# Patient Record
Sex: FEMALE | Race: WHITE | NOT HISPANIC OR LATINO | Employment: FULL TIME | URBAN - NONMETROPOLITAN AREA
[De-identification: names, ages, dates, MRNs, and addresses within clinical notes are randomized per-mention and may not be internally consistent; named-entity substitution may affect disease eponyms.]

---

## 2017-01-05 PROBLEM — R56.9 GENERALIZED CONVULSIVE SEIZURE (HCC): Status: ACTIVE | Noted: 2017-01-05

## 2017-01-05 PROBLEM — G40.909 SEIZURE DISORDER (HCC): Status: ACTIVE | Noted: 2017-01-05

## 2017-03-09 ENCOUNTER — TELEPHONE (OUTPATIENT)
Dept: NEUROLOGY | Facility: CLINIC | Age: 32
End: 2017-03-09

## 2017-03-09 DIAGNOSIS — R56.9 GENERALIZED CONVULSIVE SEIZURES (HCC): Primary | ICD-10-CM

## 2017-03-09 RX ORDER — LAMOTRIGINE 100 MG/1
100 TABLET ORAL 2 TIMES DAILY
Qty: 60 TABLET | Refills: 0 | Status: SHIPPED | OUTPATIENT
Start: 2017-03-09 | End: 2017-03-14

## 2017-03-14 DIAGNOSIS — R56.9 GENERALIZED CONVULSIVE SEIZURES (HCC): Primary | ICD-10-CM

## 2017-03-14 RX ORDER — LAMOTRIGINE 200 MG/1
200 TABLET ORAL 2 TIMES DAILY
Qty: 30 TABLET | Refills: 0 | Status: SHIPPED | OUTPATIENT
Start: 2017-03-14 | End: 2017-04-10 | Stop reason: SDUPTHER

## 2017-04-10 DIAGNOSIS — R56.9 GENERALIZED CONVULSIVE SEIZURES (HCC): ICD-10-CM

## 2017-04-10 RX ORDER — LAMOTRIGINE 200 MG/1
200 TABLET ORAL 2 TIMES DAILY
Qty: 30 TABLET | Refills: 3 | Status: SHIPPED | OUTPATIENT
Start: 2017-04-10 | End: 2017-04-12 | Stop reason: SDUPTHER

## 2017-04-12 ENCOUNTER — OFFICE VISIT (OUTPATIENT)
Dept: NEUROLOGY | Facility: CLINIC | Age: 32
End: 2017-04-12

## 2017-04-12 VITALS
HEART RATE: 64 BPM | HEIGHT: 63 IN | SYSTOLIC BLOOD PRESSURE: 112 MMHG | WEIGHT: 127 LBS | OXYGEN SATURATION: 98 % | BODY MASS INDEX: 22.5 KG/M2 | DIASTOLIC BLOOD PRESSURE: 68 MMHG

## 2017-04-12 DIAGNOSIS — R56.9 GENERALIZED CONVULSIVE SEIZURES (HCC): ICD-10-CM

## 2017-04-12 PROCEDURE — 99212 OFFICE O/P EST SF 10 MIN: CPT | Performed by: NURSE PRACTITIONER

## 2017-04-12 RX ORDER — LAMOTRIGINE 200 MG/1
200 TABLET ORAL 2 TIMES DAILY
Qty: 30 TABLET | Refills: 3 | Status: SHIPPED | OUTPATIENT
Start: 2017-04-12 | End: 2017-04-12 | Stop reason: SDUPTHER

## 2017-04-12 RX ORDER — LAMOTRIGINE 200 MG/1
200 TABLET ORAL 2 TIMES DAILY
Qty: 30 TABLET | Refills: 10 | Status: SHIPPED | OUTPATIENT
Start: 2017-04-12 | End: 2017-10-03 | Stop reason: SDUPTHER

## 2017-04-12 NOTE — PROGRESS NOTES
Subjective   Aydee Nazario is a 31 y.o. female    Chief Complaint   Patient presents with   • Seizures     reports that she remains sz free. states that the last sz was in 2014. reports no problems with her lamictal.       History of Present Illness     Patient's very pleasant 31-year-old female with sz d/o in clinic to follow up.  Last sz 2014 states ran out of meds couldn't get refilled.  No sz since states his only she started using her Lamictal 200 mg twice a day she is seizure-free.  She is working has her questions is driving and states that she's not having any problems today.    The following portions of the patient's history were reviewed and updated as appropriate: allergies, current medications, past family history, past medical history, past social history, past surgical history and problem list.    Review of Systems   Constitutional: Negative for chills and fever.   HENT: Negative for congestion, ear pain, hearing loss, rhinorrhea and sore throat.    Eyes: Negative for pain, discharge and redness.   Respiratory: Negative for cough, shortness of breath, wheezing and stridor.    Cardiovascular: Negative for chest pain, palpitations and leg swelling.   Gastrointestinal: Negative for abdominal pain, constipation, nausea and vomiting.   Endocrine: Negative for cold intolerance, heat intolerance and polyphagia.   Genitourinary: Negative for dysuria, flank pain, frequency and urgency.   Musculoskeletal: Negative for joint swelling, myalgias, neck pain and neck stiffness.   Skin: Negative for pallor, rash and wound.   Allergic/Immunologic: Negative for environmental allergies.   Neurological: Positive for seizures. Negative for dizziness, tremors, syncope, facial asymmetry, speech difficulty, weakness, light-headedness, numbness and headaches.   Hematological: Negative for adenopathy.   Psychiatric/Behavioral: Negative for confusion and hallucinations. The patient is not nervous/anxious.   "      Objective         Vitals:    04/12/17 1055   BP: 112/68   Pulse: 64   SpO2: 98%   Weight: 127 lb (57.6 kg)   Height: 63\" (160 cm)     GENERAL: Patient is pleasant, cooperative, appears to be stated age.  Body habitus is endomorphic.  HEAD:  Head is normocephalic and atraumatic.    NECK: Neck are non-tender without thyromegaly or adenopathy.  Carotic upstrokes are 1+/4.  No cranial or cervical bruits.  The neck is supple with a full range of motion.   CARDIOVASCULAR:  Regular rate and rhythm with normal S1 and S2 without rub or gallop.  RESPIRATORY:  Clear to auscultation without wheezes or crackle   PSYCH:  Higher cortical function/mental status:  The patient is alert.  She is oriented x3 to time, place and person.  Recent and the remote memory appear normal.  The patient has a good fund of knowledge.  There is no visual or auditory hallucination or suicidal or homicidal ideation.  SPEECH:There is no gross evidence of aphasia, dysarthria or agnosia.      MOTOR: Strength is 5/5 throughout with normal tone and bulk with the following exceptions, 4/5 intrinsic muscles of the hands and feet.  No involuntary movements noted.    Coordination:  The patient normally performs finger-nose-finger, heel-to-knee-to-shin and rapid alternating movements in symmetrical fashion.    COORDINATION AND GAIT:  The patient walks with a narrow-based gait.     Results for orders placed or performed during the hospital encounter of 10/28/16   hCG, Quantitative, Pregnancy   Result Value Ref Range    HCG Quantitative <2.39 mIU/mL         I have personally reviewed the above labs     Assessment/Plan   Seizure disorder: Continue Lamictal 200 mg 1 by mouth twice a day patient is stable we'll have him return to clinic in 12 months.    I have counseled patient extensively on epileptic seizure.  Epileptic seizures are a common and important medical problem, with about one in 11 persons experiencing at least one seizure at some point. The " management of patients with epilepsy is often challenging, as evidenced by a recent report that over one half of all patients with epilepsy continue to experience at least occasional seizures despite treatment with antiepileptic medications.   We have also discussed various diagnostic testing.  Diagnostic studies must be tailored to this particular patient. Basic laboratory evaluation focuses on detecting systemic disturbances potentially associated with seizures were explored.  The imaging modality of choice is magnetic resonance imaging (MRI). Electroencephalography (EEG) is helpful in the evaluation of this patient. The utility of EEG includes detection of epileptiform activity, strengthening the putative diagnosis; identification of focal electrocerebral abnormalities suggesting a focal structural brain lesion; and documentation of specific epileptiform patterns associated with particular epilepsy syndromes would be very beneficial.   For patients with relatively infrequent seizures (in whom it is difficult to gauge the response to treatment without waiting many months for the next seizure to occur), a logical approach is to promptly increase the medication to the maximum tolerated dosage and maintain it at this level. This can be accomplished by increasing the agent until the patient begins to experience expected dose-related side effects, and then reducing the dosage to the immediately previous dosage that did not produce the adverse effects. Once a steady state with the refined dosage has been achieved, it is useful to check the trough drug serum level as a reference point for the maximum tolerated dosage.  If the patient eventually experiences a seizure when the serum level is at the reference point, the trial of medication can be considered a failure. This procedure enables assessment of efficacy in a manner significantly more efficient than simply beginning at an average dosage and waiting for the next  seizure before the next increase is implemented. If adequate seizure control-which should be defined as complete seizure control for most patients-is not achieved at the maximum tolerated dosage of the first medication, consideration should be given to referring the patient for neurologic consultation, if this has not yet been undertaken. Usually, a second agent will need to be added.

## 2017-10-03 DIAGNOSIS — R56.9 GENERALIZED CONVULSIVE SEIZURES (HCC): ICD-10-CM

## 2017-10-03 RX ORDER — LAMOTRIGINE 200 MG/1
200 TABLET ORAL 2 TIMES DAILY
Qty: 60 TABLET | Refills: 5 | Status: SHIPPED | OUTPATIENT
Start: 2017-10-03 | End: 2018-04-02 | Stop reason: SDUPTHER

## 2018-04-02 DIAGNOSIS — R56.9 GENERALIZED CONVULSIVE SEIZURES (HCC): ICD-10-CM

## 2018-04-03 DIAGNOSIS — R56.9 GENERALIZED CONVULSIVE SEIZURES (HCC): ICD-10-CM

## 2018-04-03 RX ORDER — LAMOTRIGINE 200 MG/1
200 TABLET ORAL 2 TIMES DAILY
Qty: 60 TABLET | Refills: 5 | Status: SHIPPED | OUTPATIENT
Start: 2018-04-03 | End: 2018-09-21 | Stop reason: SDUPTHER

## 2018-04-03 RX ORDER — LAMOTRIGINE 200 MG/1
200 TABLET ORAL
Qty: 60 TABLET | Refills: 5 | OUTPATIENT
Start: 2018-04-03 | End: 2019-04-03

## 2018-08-08 ENCOUNTER — TELEPHONE (OUTPATIENT)
Dept: URGENT CARE | Facility: CLINIC | Age: 33
End: 2018-08-08

## 2018-08-15 ENCOUNTER — TELEPHONE (OUTPATIENT)
Dept: URGENT CARE | Facility: CLINIC | Age: 33
End: 2018-08-15

## 2018-08-15 NOTE — TELEPHONE ENCOUNTER
----- Message from Lin Hernandez MD sent at 8/15/2018 12:34 PM EDT -----  Last urine cx done at office visit on 8-9-18 showed no growth so patient did not have UTI; symptoms likely due to bacterial vaginosis which was treated with metronidazole

## 2018-09-21 ENCOUNTER — OFFICE VISIT (OUTPATIENT)
Dept: NEUROLOGY | Facility: CLINIC | Age: 33
End: 2018-09-21

## 2018-09-21 VITALS
WEIGHT: 139 LBS | SYSTOLIC BLOOD PRESSURE: 112 MMHG | HEART RATE: 69 BPM | OXYGEN SATURATION: 98 % | HEIGHT: 63 IN | BODY MASS INDEX: 24.63 KG/M2 | DIASTOLIC BLOOD PRESSURE: 70 MMHG

## 2018-09-21 DIAGNOSIS — G40.909 SEIZURE DISORDER (HCC): Primary | ICD-10-CM

## 2018-09-21 DIAGNOSIS — R56.9 GENERALIZED CONVULSIVE SEIZURES (HCC): ICD-10-CM

## 2018-09-21 PROCEDURE — 99213 OFFICE O/P EST LOW 20 MIN: CPT | Performed by: NURSE PRACTITIONER

## 2018-09-21 RX ORDER — LAMOTRIGINE 200 MG/1
200 TABLET ORAL 2 TIMES DAILY
Qty: 60 TABLET | Refills: 11 | Status: SHIPPED | OUTPATIENT
Start: 2018-09-21 | End: 2019-09-13 | Stop reason: SDUPTHER

## 2018-09-21 NOTE — PROGRESS NOTES
Subjective   Aydee Nazario is a 33 y.o. female     Chief Complaint   Patient presents with   • Follow-up     Pt is here for a 1 year FU for her seizures. Pt states that she has been seizure free for approx 4 years. Pt states that her medications are working and does need refills at this time.        History of Present Illness     Patient is very pleasant 33-year-old female in clinic today to follow-up seizure disorder for her one-year appointment.  Patient continues to be seizure-free since 2014 she uses Lamictal 200 mg twice a day which she states also helps control with her mood patient's very pleased with her current medication and does not one any changes she states that she does need a refill and denies any other issues at this time.    The following portions of the patient's history were reviewed and updated as appropriate: allergies, current medications, past family history, past medical history, past social history, past surgical history and problem list.    Review of Systems   Constitutional: Negative for chills and fever.   HENT: Negative for congestion, ear pain, hearing loss, rhinorrhea and sore throat.    Eyes: Negative for pain, discharge and redness.   Respiratory: Negative for cough, shortness of breath, wheezing and stridor.    Cardiovascular: Negative for chest pain, palpitations and leg swelling.   Gastrointestinal: Negative for abdominal pain, constipation, nausea and vomiting.   Endocrine: Negative for cold intolerance, heat intolerance and polyphagia.   Genitourinary: Negative for dysuria, flank pain, frequency and urgency.   Musculoskeletal: Negative for joint swelling, myalgias, neck pain and neck stiffness.   Skin: Negative for pallor, rash and wound.   Allergic/Immunologic: Negative for environmental allergies.   Neurological: Positive for seizures. Negative for dizziness, tremors, syncope, facial asymmetry, speech difficulty, weakness, light-headedness, numbness and headaches.  "  Hematological: Negative for adenopathy.   Psychiatric/Behavioral: Negative for confusion and hallucinations. The patient is nervous/anxious.        Objective       Vitals:    09/21/18 1545   BP: 112/70   Pulse: 69   SpO2: 98%   Weight: 63 kg (139 lb)   Height: 160 cm (63\")     GENERAL: Patient is pleasant, cooperative, appears to be stated age.  Body habitus is endomorphic.  HEAD:  Head is normocephalic and atraumatic.    NECK: Neck are non-tender without thyromegaly or adenopathy.  Carotic upstrokes are 1+/4.  No cranial or cervical bruits.  The neck is supple with a full range of motion.   CARDIOVASCULAR:  Regular rate and rhythm with normal S1 and S2 without rub or gallop.  RESPIRATORY:  Clear to auscultation without wheezes or crackle   PSYCH:   The patient is alert.  She  is oriented x3 to time, place and person.  Recent and the remote memory appear normal.  The patient has a good fund of knowledge.  There is no visual or auditory hallucination or suicidal or homicidal ideation.  SPEECH:There is no gross evidence of aphasia, dysarthria or agnosia.      CRANIAL NERVES: Extraocular movements are full and smooth with normal pursuits and saccades.  No nystagmus noted.  The face is symmetric. Tongue midline.   COORDINATION AND GAIT:  The patient walks with a narrow-based gait.     Results for orders placed or performed during the hospital encounter of 08/07/18   Urine Culture - Urine, Urine, Clean Catch   Result Value Ref Range    Urine Culture Final report     Result 1 No growth    POC Urinalysis Dipstick, Multipro (Automated dipstick)   Result Value Ref Range    Color Dark Yellow Yellow, Straw, Dark Yellow, Luann    Clarity, UA Cloudy (A) Clear    Glucose, UA Negative Negative, 1000 mg/dL (3+) mg/dL    Bilirubin Negative Negative    Ketones, UA Negative Negative    Specific Gravity  1.030 1.005 - 1.030    Blood, UA Negative Negative    pH, Urine 6.0 5.0 - 8.0    Protein, POC Negative Negative mg/dL    " Urobilinogen, UA Normal Normal    Nitrite, UA Negative Negative    Leukocytes Small (1+) (A) Negative       I have personally reviewed the above labs. Pt follows with PCP.    Assessment/Plan     1.  Seizure disorder: Patient seizure-free since 2014 we'll continue with Lamictal 200 mg twice a day return to clinic in one year.

## 2019-04-03 PROBLEM — R30.0 DYSURIA: Status: ACTIVE | Noted: 2019-04-03

## 2019-09-13 DIAGNOSIS — R56.9 GENERALIZED CONVULSIVE SEIZURES (HCC): ICD-10-CM

## 2019-09-13 RX ORDER — LAMOTRIGINE 200 MG/1
TABLET ORAL
Qty: 60 TABLET | Refills: 11 | Status: SHIPPED | OUTPATIENT
Start: 2019-09-13 | End: 2019-09-24 | Stop reason: SDUPTHER

## 2019-09-24 ENCOUNTER — OFFICE VISIT (OUTPATIENT)
Dept: NEUROLOGY | Facility: CLINIC | Age: 34
End: 2019-09-24

## 2019-09-24 VITALS
HEART RATE: 78 BPM | DIASTOLIC BLOOD PRESSURE: 60 MMHG | WEIGHT: 151.4 LBS | HEIGHT: 63 IN | SYSTOLIC BLOOD PRESSURE: 126 MMHG | BODY MASS INDEX: 26.82 KG/M2 | TEMPERATURE: 96.9 F | OXYGEN SATURATION: 99 %

## 2019-09-24 DIAGNOSIS — R56.9 GENERALIZED CONVULSIVE SEIZURES (HCC): ICD-10-CM

## 2019-09-24 PROCEDURE — 99212 OFFICE O/P EST SF 10 MIN: CPT | Performed by: NURSE PRACTITIONER

## 2019-09-24 RX ORDER — CHLORHEXIDINE GLUCONATE 0.12 MG/ML
RINSE ORAL
COMMUNITY
Start: 2019-06-19 | End: 2020-08-07

## 2019-09-24 RX ORDER — LAMOTRIGINE 200 MG/1
200 TABLET ORAL 2 TIMES DAILY
Qty: 60 TABLET | Refills: 11 | Status: SHIPPED | OUTPATIENT
Start: 2019-09-24 | End: 2020-04-30 | Stop reason: SDUPTHER

## 2020-02-25 ENCOUNTER — TELEPHONE (OUTPATIENT)
Dept: NEUROLOGY | Facility: CLINIC | Age: 35
End: 2020-02-25

## 2020-02-25 NOTE — TELEPHONE ENCOUNTER
Provider: ALICIA MIKE  Caller: JULIENNE DOOLEY  Relationship to Patient: SELF  Phone Number: [phone]     Reason for Call: PT CALLED TO RESCHEDULE APPT WITH A NEW NEUROLOGIST DUE TO ALICIA MIKE NO LONGER THERE.    YOU CAN CALL PT BACK [phone]

## 2020-03-05 ENCOUNTER — TELEPHONE (OUTPATIENT)
Dept: NEUROLOGY | Facility: CLINIC | Age: 35
End: 2020-03-05

## 2020-03-05 NOTE — TELEPHONE ENCOUNTER
JULIENNE  [phone]    NEEDS TO BE SCHEDULED FOR HER 1 YEAR FOLLOW UP AND SHE WANTS TO STAY IN [facility]

## 2020-03-05 NOTE — TELEPHONE ENCOUNTER
THERE IS NO ONE IN THE [place] OFFICE SHE CAN FOLLOW UP WITH.  IF SHE WOULD LIKE TO BE REFERRED TO DR ADRIAN IN [place], WE WILL REFER HER OUT.

## 2020-03-10 ENCOUNTER — TELEPHONE (OUTPATIENT)
Dept: NEUROLOGY | Facility: OTHER | Age: 35
End: 2020-03-10

## 2020-03-10 NOTE — TELEPHONE ENCOUNTER
Provider: ALICIA MIKE  Caller: JULIENNE DOOLEY  Relationship to Patient: SELF  Phone Number: [phone]      Reason for Call: PT CALLING TO GET SET UP WITH A NEW PROVIDER AND DOES NOT WANT TO TRAVEL TO [place], PLEASE CALL HER [phone].

## 2020-03-10 NOTE — TELEPHONE ENCOUNTER
LEFT MSG FOR PT TO CALL OFFICE CONCERNING A NEW PROVIDER.  CURRENTLY [facility] IS NOT ABLE TO SEE SEIZURE DIAGNOSIS.     THE PATIENT CAN BE REFERRED TO [place] OFFICE TO SEE DR CORNELL .      PT STATED IN PRIOR MSG THAT SHE COULD NOT GO TO [place].  SHE WOULD NEED TO CONTACT HER PRIMARY CARE PROVIDER FOR CONTINUATION OF CARE FOR A NEW PROVIDER.

## 2020-04-09 ENCOUNTER — TELEPHONE (OUTPATIENT)
Dept: NEUROLOGY | Facility: CLINIC | Age: 35
End: 2020-04-09

## 2020-04-09 NOTE — TELEPHONE ENCOUNTER
Please schedule patient a VIDEO VISIT to establish care with our office and we are happy to send any refills she needs. Per the chart review of meds, SUSSY Peñaloza sent in her lamotrigine for 1 year of refills 9/2019 and should last her, but we can definitely refill if pharmacy says she is running low. I am happy to see her for a Video Visit. Thanks.

## 2020-04-09 NOTE — TELEPHONE ENCOUNTER
PT HAS APPOINTMENT WITH  9- . PT STATES SHE IS GOING TO RUN OUT OF MEDICATION AROUND AUGUST.    PLEASE ADVISE    JULIENNE  [phone]

## 2020-04-09 NOTE — TELEPHONE ENCOUNTER
Her les is empty. She is only on lamictal which is not controlled and she has plenty of medication until 4/30/2020. Thanks.

## 2020-04-09 NOTE — TELEPHONE ENCOUNTER
SENT ENCOUNTER TO [facility] , THEY STATE SHE IS NOW A PATIENT OF [facility] .     PT APT WITH  - 9-29-20  LAST TIME SEEN - 9-24-19 ( ALICIA MIKE)    PATIENT STATES SHE WILL NEED REFILL BEFORE APPOINTMENT. PLEASE ADVISE!

## 2020-04-30 ENCOUNTER — OFFICE VISIT (OUTPATIENT)
Dept: NEUROLOGY | Facility: CLINIC | Age: 35
End: 2020-04-30

## 2020-04-30 DIAGNOSIS — R56.9 GENERALIZED CONVULSIVE SEIZURES (HCC): ICD-10-CM

## 2020-04-30 DIAGNOSIS — G40.909 SEIZURE DISORDER (HCC): Primary | ICD-10-CM

## 2020-04-30 PROCEDURE — 99441 PR PHYS/QHP TELEPHONE EVALUATION 5-10 MIN: CPT | Performed by: PSYCHIATRY & NEUROLOGY

## 2020-04-30 RX ORDER — LAMOTRIGINE 200 MG/1
200 TABLET ORAL 2 TIMES DAILY
Qty: 60 TABLET | Refills: 11 | Status: SHIPPED | OUTPATIENT
Start: 2020-04-30 | End: 2021-05-07

## 2020-04-30 NOTE — PROGRESS NOTES
Subjective:     Patient ID: Aydee Nazario is a 34 y.o. female.    CC:   Chief Complaint   Patient presents with   • Seizures     You have chosen to receive care through a telephone visit. Do you consent to use a telephone visit for your medical care today? Yes    HPI:   History of Present Illness  The following portions of the patient's history were reviewed and updated as appropriate: allergies, current medications, past family history, past medical history, past social history, past surgical history and problem list.     Patient denies any seizures, new concerns, needs refills. She is s/p tubal ligation. Last seizure reported in . Reports that seizures are hereditary, prior brain scan negative.    Past Medical History:   Diagnosis Date   • Epilepsy (CMS/[facility])    • H/O colposcopy with cervical biopsy 09/15/2011   • History of abnormal cervical Pap smear 10/11/2010    ASCUS + HPV (DURING PREGNANCY)   • History of abnormal cervical Pap smear 2011    HSIL, MODERATE DYSPLASIA   • History of Papanicolaou smear of cervix 10/29/2013    NORMAL    • HPV in female        Past Surgical History:   Procedure Laterality Date   •  SECTION PRIMARY  2011    DR ANNETTE BIRD   •  SECTION WITH TUBAL Bilateral 2014    DR LOVE BOLAND   • DENTAL PROCEDURE     • TUBAL ABDOMINAL LIGATION         Social History     Socioeconomic History   • Marital status:      Spouse name: Not on file   • Number of children: Not on file   • Years of education: Not on file   • Highest education level: Not on file   Tobacco Use   • Smoking status: Never Smoker   • Smokeless tobacco: Never Used   Substance and Sexual Activity   • Alcohol use: No   • Drug use: No   • Sexual activity: Defer     Birth control/protection: Surgical       Family History   Problem Relation Age of Onset   • Cancer Other    • Dementia Other    • Seizures Other         epilepsy and recurrent seizures        Review of Systems      Objective:  There were no vitals taken for this visit.    Neurologic Exam     Mental Status   Alert and oriented, speech clear.           Assessment/Plan:       Aydee was seen today for seizures.    Diagnoses and all orders for this visit:    Seizure disorder (CMS/HCC)    Generalized convulsive seizures (CMS/HCC)  -     lamoTRIgine (LaMICtal) 200 MG tablet; Take 1 tablet by mouth 2 (Two) Times a Day.        -     Patient is to call for problems, questions.       Time on the phone: 10 min.        Thomas Olson MD  4/30/2020

## 2021-01-07 ENCOUNTER — TELEPHONE (OUTPATIENT)
Dept: NEUROLOGY | Facility: CLINIC | Age: 36
End: 2021-01-07

## 2021-01-07 NOTE — TELEPHONE ENCOUNTER
Provider: DR. ELIZABETH  Caller: JULIENNE DOOLEY  Relationship to Patient: SELF    Reason for Call: PT CALLING SHE HAS AN APPT ON 4- AND HER LAST VISIT WAS A TELEPHONE VISIT ON 4-. SHE IS WANTING TO SEE IF SHE CAN DO ANOTHER TELEPHONE VISIT ON THE UPCOMING VISIT ON 4-.    PLEASE CALL HER BACK [phone]

## 2021-04-06 ENCOUNTER — TELEPHONE (OUTPATIENT)
Dept: NEUROLOGY | Facility: CLINIC | Age: 36
End: 2021-04-06

## 2021-05-07 ENCOUNTER — HOSPITAL ENCOUNTER (EMERGENCY)
Facility: HOSPITAL | Age: 36
Discharge: HOME | End: 2021-05-07
Attending: EMERGENCY MEDICINE | Admitting: EMERGENCY MEDICINE

## 2021-05-07 VITALS
RESPIRATION RATE: 16 BRPM | DIASTOLIC BLOOD PRESSURE: 72 MMHG | OXYGEN SATURATION: 99 % | WEIGHT: 158 LBS | HEART RATE: 71 BPM | HEIGHT: 63 IN | BODY MASS INDEX: 28 KG/M2 | TEMPERATURE: 97.9 F | SYSTOLIC BLOOD PRESSURE: 120 MMHG

## 2021-05-07 DIAGNOSIS — N92.6 IRREGULAR MENSES: Primary | ICD-10-CM

## 2021-05-07 DIAGNOSIS — R56.9 GENERALIZED CONVULSIVE SEIZURES (HCC): ICD-10-CM

## 2021-05-07 LAB — B-HCG UR QL: NEGATIVE

## 2021-05-07 PROCEDURE — 81025 URINE PREGNANCY TEST: CPT | Performed by: PHYSICIAN ASSISTANT

## 2021-05-07 PROCEDURE — 99283 EMERGENCY DEPT VISIT LOW MDM: CPT

## 2021-05-07 RX ORDER — LAMOTRIGINE 200 MG/1
TABLET ORAL
Qty: 60 TABLET | Refills: 11 | Status: SHIPPED | OUTPATIENT
Start: 2021-05-07 | End: 2021-08-20 | Stop reason: SDUPTHER

## 2021-08-20 ENCOUNTER — OFFICE VISIT (OUTPATIENT)
Dept: NEUROLOGY | Facility: CLINIC | Age: 36
End: 2021-08-20

## 2021-08-20 ENCOUNTER — LAB (OUTPATIENT)
Dept: LAB | Facility: HOSPITAL | Age: 36
End: 2021-08-20

## 2021-08-20 VITALS
WEIGHT: 161 LBS | DIASTOLIC BLOOD PRESSURE: 60 MMHG | BODY MASS INDEX: 28.53 KG/M2 | SYSTOLIC BLOOD PRESSURE: 100 MMHG | TEMPERATURE: 97.6 F | OXYGEN SATURATION: 98 % | HEART RATE: 96 BPM | HEIGHT: 63 IN

## 2021-08-20 DIAGNOSIS — G40.909 SEIZURE DISORDER (HCC): Primary | ICD-10-CM

## 2021-08-20 DIAGNOSIS — R56.9 GENERALIZED CONVULSIVE SEIZURES (HCC): ICD-10-CM

## 2021-08-20 DIAGNOSIS — G40.909 SEIZURE DISORDER (HCC): ICD-10-CM

## 2021-08-20 LAB
ALBUMIN SERPL-MCNC: 4.5 G/DL (ref 3.5–5.2)
ALBUMIN/GLOB SERPL: 1.5 G/DL
ALP SERPL-CCNC: 111 U/L (ref 39–117)
ALT SERPL W P-5'-P-CCNC: 15 U/L (ref 1–33)
ANION GAP SERPL CALCULATED.3IONS-SCNC: 7.4 MMOL/L (ref 5–15)
AST SERPL-CCNC: 20 U/L (ref 1–32)
BASOPHILS # BLD AUTO: 0.01 10*3/MM3 (ref 0–0.2)
BASOPHILS NFR BLD AUTO: 0.2 % (ref 0–1.5)
BILIRUB SERPL-MCNC: 0.3 MG/DL (ref 0–1.2)
BUN SERPL-MCNC: 9 MG/DL (ref 6–20)
BUN/CREAT SERPL: 11.7 (ref 7–25)
CALCIUM SPEC-SCNC: 9.8 MG/DL (ref 8.6–10.5)
CHLORIDE SERPL-SCNC: 103 MMOL/L (ref 98–107)
CO2 SERPL-SCNC: 27.6 MMOL/L (ref 22–29)
CREAT SERPL-MCNC: 0.77 MG/DL (ref 0.57–1)
DEPRECATED RDW RBC AUTO: 43 FL (ref 37–54)
EOSINOPHIL # BLD AUTO: 0.02 10*3/MM3 (ref 0–0.4)
EOSINOPHIL NFR BLD AUTO: 0.4 % (ref 0.3–6.2)
ERYTHROCYTE [DISTWIDTH] IN BLOOD BY AUTOMATED COUNT: 13.4 % (ref 12.3–15.4)
GFR SERPL CREATININE-BSD FRML MDRD: 84 ML/MIN/1.73
GLOBULIN UR ELPH-MCNC: 3 GM/DL
GLUCOSE SERPL-MCNC: 76 MG/DL (ref 65–99)
HCT VFR BLD AUTO: 41.6 % (ref 34–46.6)
HGB BLD-MCNC: 13.4 G/DL (ref 12–15.9)
IMM GRANULOCYTES # BLD AUTO: 0.02 10*3/MM3 (ref 0–0.05)
IMM GRANULOCYTES NFR BLD AUTO: 0.4 % (ref 0–0.5)
LYMPHOCYTES # BLD AUTO: 1.41 10*3/MM3 (ref 0.7–3.1)
LYMPHOCYTES NFR BLD AUTO: 25.1 % (ref 19.6–45.3)
MCH RBC QN AUTO: 28.3 PG (ref 26.6–33)
MCHC RBC AUTO-ENTMCNC: 32.2 G/DL (ref 31.5–35.7)
MCV RBC AUTO: 87.9 FL (ref 79–97)
MONOCYTES # BLD AUTO: 0.46 10*3/MM3 (ref 0.1–0.9)
MONOCYTES NFR BLD AUTO: 8.2 % (ref 5–12)
NEUTROPHILS NFR BLD AUTO: 3.69 10*3/MM3 (ref 1.7–7)
NEUTROPHILS NFR BLD AUTO: 65.7 % (ref 42.7–76)
NRBC BLD AUTO-RTO: 0 /100 WBC (ref 0–0.2)
PLATELET # BLD AUTO: 295 10*3/MM3 (ref 140–450)
PMV BLD AUTO: 10.5 FL (ref 6–12)
POTASSIUM SERPL-SCNC: 4.3 MMOL/L (ref 3.5–5.2)
PROT SERPL-MCNC: 7.5 G/DL (ref 6–8.5)
RBC # BLD AUTO: 4.73 10*6/MM3 (ref 3.77–5.28)
SODIUM SERPL-SCNC: 138 MMOL/L (ref 136–145)
TSH SERPL DL<=0.05 MIU/L-ACNC: 0.87 UIU/ML (ref 0.27–4.2)
WBC # BLD AUTO: 5.61 10*3/MM3 (ref 3.4–10.8)

## 2021-08-20 PROCEDURE — 36415 COLL VENOUS BLD VENIPUNCTURE: CPT

## 2021-08-20 PROCEDURE — 80050 GENERAL HEALTH PANEL: CPT

## 2021-08-20 PROCEDURE — 99213 OFFICE O/P EST LOW 20 MIN: CPT | Performed by: NURSE PRACTITIONER

## 2021-08-20 RX ORDER — LAMOTRIGINE 200 MG/1
200 TABLET ORAL 2 TIMES DAILY
Qty: 60 TABLET | Refills: 11 | Status: SHIPPED | OUTPATIENT
Start: 2021-08-20 | End: 2022-08-10 | Stop reason: SDUPTHER

## 2021-08-20 NOTE — PROGRESS NOTES
Please let patient know her thyroid study was in normal range, kidney/electrolyte /liver panel normal as well.  CBC stable.

## 2021-08-20 NOTE — PROGRESS NOTES
"Subjective:     Patient ID: Aydee Nazario is a 35 y.o. female.    CC:   Chief Complaint   Patient presents with   • Seizures       HPI:   History of Present Illness     Ms. Nazario is a very pleasant 35-year-old female here today for follow-up on seizure disorder.  She was a previous patient of Dr. Cleaning and Aries Howard in [facility].  She has a long history of seizures dating back to around the age of 7.  She has what she describes as generalized convulsive seizures.  She has been on lamotrigine for many years, she currently takes 200 mg twice a day.  Many years ago she was taken off medication and had rebound seizure.  Her last seizure was 7 years ago.  She does very well on the lamotrigine with no adverse side effects.  She denies headaches, vision changes, dizziness, weakness or falls.  She has no history of head injury, her mother had epilepsy but \"grew out of it\".  The following portions of the patient's history were reviewed and updated as appropriate: allergies, current medications, past family history, past medical history, past social history, past surgical history and problem list.    Past Medical History:   Diagnosis Date   • Epilepsy (CMS/[facility])    • H/O colposcopy with cervical biopsy 09/15/2011   • History of abnormal cervical Pap smear 10/11/2010    ASCUS + HPV (DURING PREGNANCY)   • History of abnormal cervical Pap smear 2011    HSIL, MODERATE DYSPLASIA   • History of Papanicolaou smear of cervix 10/29/2013    NORMAL    • HPV in female        Past Surgical History:   Procedure Laterality Date   •  SECTION PRIMARY  2011    DR ANNETTE BIRD   •  SECTION WITH TUBAL Bilateral 2014    DR LOVE BOLAND   • DENTAL PROCEDURE     • TUBAL ABDOMINAL LIGATION         Social History     Socioeconomic History   • Marital status:      Spouse name: Not on file   • Number of children: Not on file   • Years of education: Not on file   • Highest education level: Not " "on file   Tobacco Use   • Smoking status: Never Smoker   • Smokeless tobacco: Never Used   Vaping Use   • Vaping Use: Never used   Substance and Sexual Activity   • Alcohol use: No   • Drug use: No   • Sexual activity: Defer     Birth control/protection: Surgical       Family History   Problem Relation Age of Onset   • Cancer Other    • Dementia Other    • Seizures Other         epilepsy and recurrent seizures        Review of Systems   Constitutional: Negative.    HENT: Negative.    Eyes: Negative.    Respiratory: Negative.    Cardiovascular: Negative.    Gastrointestinal: Negative.    Endocrine: Negative.    Genitourinary: Negative.    Musculoskeletal: Negative.    Skin: Negative.    Allergic/Immunologic: Negative.    Neurological: Positive for seizures. Negative for dizziness, tremors, syncope, facial asymmetry, speech difficulty, weakness, light-headedness, numbness and headaches.   Hematological: Negative.    Psychiatric/Behavioral: Negative.         Objective:  /60   Pulse 96   Temp 97.6 °F (36.4 °C)   Ht 160 cm (63\")   Wt 73 kg (161 lb)   SpO2 98%   BMI 28.52 kg/m²     Neurologic Exam     Mental Status   Oriented to person, place, and time.   Attention: normal. Concentration: normal.   Speech: speech is normal   Level of consciousness: alert  Knowledge: consistent with education.   Able to read. Able to write. Normal comprehension.     Cranial Nerves   Cranial nerves II through XII intact.     CN II   Visual fields full to confrontation.   Right visual field deficit: none  Left visual field deficit: none     CN III, IV, VI   Pupils are equal, round, and reactive to light.  Right pupil: Shape: regular. Reactivity: brisk. Consensual response: intact. Accommodation: intact.   Left pupil: Shape: regular. Reactivity: brisk. Consensual response: intact. Accommodation: intact.   CN III: no CN III palsy  CN VI: no CN VI palsy  Nystagmus: none   Upgaze: normal  Downgaze: normal    CN V   Facial sensation " intact.     CN VII   Facial expression full, symmetric.     CN VIII   CN VIII normal.     CN IX, X   CN IX normal.   CN X normal.     CN XI   CN XI normal.     CN XII   CN XII normal.     Motor Exam   Muscle bulk: normal  Overall muscle tone: normal  Right arm tone: normal  Left arm tone: normal  Right arm pronator drift: absent  Left arm pronator drift: absent  Right leg tone: normal  Left leg tone: normal    Strength   Strength 5/5 throughout.     Sensory Exam   Light touch normal.     Gait, Coordination, and Reflexes     Gait  Gait: normal    Coordination   Romberg: negative  Finger to nose coordination: normal  Heel to shin coordination: normal  Tandem walking coordination: normal    Tremor   Resting tremor: absent  Intention tremor: absent  Action tremor: absent    Reflexes   Reflexes 2+ except as noted.   Right plantar: normal  Left plantar: normal  Right Sosa: absent  Left Sosa: absent      Physical Exam  Eyes:      Pupils: Pupils are equal, round, and reactive to light.   Neurological:      Mental Status: She is oriented to person, place, and time.      Coordination: Finger-Nose-Finger Test, Heel to Shin Test and Romberg Test normal.      Gait: Gait is intact. Tandem walk normal.      Deep Tendon Reflexes: Strength normal.   Psychiatric:         Speech: Speech normal.         Assessment/Plan:       Diagnoses and all orders for this visit:    1. Seizure disorder (CMS/HCC) (Primary)  -     CBC & Differential; Future  -     Comprehensive Metabolic Panel; Future  -     TSH; Future    2. Generalized convulsive seizures (CMS/HCC)  -     lamoTRIgine (LaMICtal) 200 MG tablet; Take 1 tablet by mouth 2 (Two) Times a Day.  Dispense: 60 tablet; Refill: 11    Ms. Nazario is doing very well on lamotrigine 200 mg twice a day.  She was a previous [place] [facility] patient, there was a period of time when her follow-up was due that they did not have a provider in the office so she saw Dr. Olson via telephone visit.   Patient would like to transfer back to [facility] as this is much more convenient for her due to her location.  She is doing very well at this time, labs pending.  I have given her refills on her lamotrigine for 1 year, 1 year follow-up in [place] neurolog[facility] clinic, patient was given the number for the [facility] to call and schedule this.  In the meantime she is to contact our office with any questions concerns or problems prior to follow-up appointment, notify our office with any seizure activity.  Patient instructions include: No driving or operating heavy machinery for 3 months from onset of most recent seizure. Minimize stress as much as possible. Recommended 7-8 hours of sleep each night. Abstain from alcohol intake. Educated on Antiepileptic medications with possible side effects and signs and symptoms to report if prescribed during visit. Instructed to take seizure medication daily if prescribed. Reviewed potential seizure risk factors. Instructed to call 911 or our office if another seizure does occur.           Reviewed medications, potential side effects and signs and symptoms to report. Discussed risk versus benefits of treatment plan with patient and/or family-including medications, labs and radiology that may be ordered. Addressed questions and concerns during visit. Patient and/or family verbalized understanding and agree with plan.    AS THE PROVIDER, I PERSONALLY WORE PPE DURING ENTIRE FACE TO FACE ENCOUNTER IN CLINIC WITH THE PATIENT. PATIENT ALSO WORE PPE DURING ENTIRE FACE TO FACE ENCOUNTER EXCEPT FOR A MAX OF 30 SECONDS DURING NEUROLOGICAL EVALUATION OF CRANIAL NERVES AND THEN MASK WAS PLACED BACK OVER PATIENT FACE FOR REMAINDER OF VISIT. I WASHED MY HANDS BEFORE AND AFTER VISIT.    During this visit the following were done:  Labs Reviewed []    Labs Ordered []    Radiology Reports Reviewed []    Radiology Ordered []    PCP Records Reviewed []    Referring Provider Records Reviewed []     ER Records Reviewed []    Hospital Records Reviewed []    History Obtained From Family []    Radiology Images Reviewed []    Other Reviewed []    Records Requested []      Sujatha Hooper MA  8/20/2021

## 2021-08-25 ENCOUNTER — TELEPHONE (OUTPATIENT)
Dept: NEUROLOGY | Facility: CLINIC | Age: 36
End: 2021-08-25

## 2021-08-25 NOTE — TELEPHONE ENCOUNTER
----- Message from SUSSY Arana sent at 8/20/2021  4:14 PM EDT -----  Please let patient know her thyroid study was in normal range, kidney/electrolyte /liver panel normal as well.  CBC stable.

## 2022-06-23 ENCOUNTER — TRANSCRIBE ORDERS (OUTPATIENT)
Dept: ULTRASOUND IMAGING | Facility: HOSPITAL | Age: 37
End: 2022-06-23

## 2022-06-23 ENCOUNTER — HOSPITAL ENCOUNTER (OUTPATIENT)
Dept: ULTRASOUND IMAGING | Facility: HOSPITAL | Age: 37
Discharge: HOME | End: 2022-06-23
Admitting: NURSE PRACTITIONER

## 2022-06-23 DIAGNOSIS — N92.6 MISSED MENSES: Primary | ICD-10-CM

## 2022-06-23 DIAGNOSIS — N92.6 MISSED MENSES: ICD-10-CM

## 2022-06-23 PROCEDURE — 76830 TRANSVAGINAL US NON-OB: CPT

## 2022-07-08 ENCOUNTER — TELEPHONE (OUTPATIENT)
Dept: NEUROLOGY | Facility: CLINIC | Age: 37
End: 2022-07-08

## 2022-07-08 NOTE — TELEPHONE ENCOUNTER
Provider: GONZALO HUTCHINSON  Caller: JULIENNE  Relationship to Patient: SELF  Pharmacy: N/A  Phone Number: [phone]  Reason for Call: PATIENT WANTS TO CHANGE HER YEARLY FOLLOW UP APPT ON 08/10/2022 TO A TELEPHONE VISIT    PLEASE CALL AND ADVISE    THANK YOU  When was the patient last seen: 08/20/2021

## 2022-07-11 NOTE — TELEPHONE ENCOUNTER
Lm FOR PATIENT TO LET HER KNOW WE DO NOT DO TELEPHONE VISITS NOW AND THAT IF IT HAS BEEN A YEAR IT SHOULD BE AN IN PERSON VISIT.

## 2022-08-10 ENCOUNTER — LAB (OUTPATIENT)
Dept: LAB | Facility: HOSPITAL | Age: 37
End: 2022-08-10

## 2022-08-10 DIAGNOSIS — R56.9 GENERALIZED CONVULSIVE SEIZURES: ICD-10-CM

## 2022-08-10 DIAGNOSIS — E16.2 HYPOGLYCEMIA: ICD-10-CM

## 2022-08-10 LAB
DEPRECATED RDW RBC AUTO: 43.9 FL (ref 37–54)
ERYTHROCYTE [DISTWIDTH] IN BLOOD BY AUTOMATED COUNT: 13.3 % (ref 12.3–15.4)
HCT VFR BLD AUTO: 40.1 % (ref 34–46.6)
HGB BLD-MCNC: 13.1 G/DL (ref 12–15.9)
MCH RBC QN AUTO: 29.5 PG (ref 26.6–33)
MCHC RBC AUTO-ENTMCNC: 32.7 G/DL (ref 31.5–35.7)
MCV RBC AUTO: 90.3 FL (ref 79–97)
PLATELET # BLD AUTO: 264 10*3/MM3 (ref 140–450)
PMV BLD AUTO: 10.3 FL (ref 6–12)
RBC # BLD AUTO: 4.44 10*6/MM3 (ref 3.77–5.28)
TSH SERPL DL<=0.05 MIU/L-ACNC: 1.16 UIU/ML (ref 0.27–4.2)
WBC NRBC COR # BLD: 4.94 10*3/MM3 (ref 3.4–10.8)

## 2022-08-10 PROCEDURE — 83036 HEMOGLOBIN GLYCOSYLATED A1C: CPT

## 2022-08-10 PROCEDURE — 80050 GENERAL HEALTH PANEL: CPT

## 2022-08-10 PROCEDURE — 36415 COLL VENOUS BLD VENIPUNCTURE: CPT

## 2022-08-11 ENCOUNTER — LAB (OUTPATIENT)
Dept: LAB | Facility: HOSPITAL | Age: 37
End: 2022-08-11

## 2022-08-11 ENCOUNTER — TELEPHONE (OUTPATIENT)
Dept: NEUROLOGY | Facility: CLINIC | Age: 37
End: 2022-08-11

## 2022-08-11 DIAGNOSIS — E16.2 HYPOGLYCEMIA: ICD-10-CM

## 2022-08-11 DIAGNOSIS — E16.2 HYPOGLYCEMIA: Primary | ICD-10-CM

## 2022-08-11 LAB
ALBUMIN SERPL-MCNC: 4.3 G/DL (ref 3.5–5.2)
ALBUMIN/GLOB SERPL: 1.7 G/DL
ALP SERPL-CCNC: 109 U/L (ref 39–117)
ALT SERPL W P-5'-P-CCNC: 19 U/L (ref 1–33)
ANION GAP SERPL CALCULATED.3IONS-SCNC: 11.9 MMOL/L (ref 5–15)
ANION GAP SERPL CALCULATED.3IONS-SCNC: 7.6 MMOL/L (ref 5–15)
AST SERPL-CCNC: 19 U/L (ref 1–32)
BILIRUB SERPL-MCNC: 0.3 MG/DL (ref 0–1.2)
BUN SERPL-MCNC: 10 MG/DL (ref 6–20)
BUN SERPL-MCNC: 13 MG/DL (ref 6–20)
BUN/CREAT SERPL: 13.3 (ref 7–25)
BUN/CREAT SERPL: 16.5 (ref 7–25)
CALCIUM SPEC-SCNC: 9.2 MG/DL (ref 8.6–10.5)
CALCIUM SPEC-SCNC: 9.8 MG/DL (ref 8.6–10.5)
CHLORIDE SERPL-SCNC: 105 MMOL/L (ref 98–107)
CHLORIDE SERPL-SCNC: 99 MMOL/L (ref 98–107)
CO2 SERPL-SCNC: 26.4 MMOL/L (ref 22–29)
CO2 SERPL-SCNC: 27.1 MMOL/L (ref 22–29)
CREAT SERPL-MCNC: 0.75 MG/DL (ref 0.57–1)
CREAT SERPL-MCNC: 0.79 MG/DL (ref 0.57–1)
EGFRCR SERPLBLD CKD-EPI 2021: 104.7 ML/MIN/1.73
EGFRCR SERPLBLD CKD-EPI 2021: 98.3 ML/MIN/1.73
GLOBULIN UR ELPH-MCNC: 2.6 GM/DL
GLUCOSE SERPL-MCNC: 109 MG/DL (ref 65–99)
GLUCOSE SERPL-MCNC: 33 MG/DL (ref 65–99)
HBA1C MFR BLD: 4.8 % (ref 4.8–5.6)
POTASSIUM SERPL-SCNC: 3.9 MMOL/L (ref 3.5–5.2)
POTASSIUM SERPL-SCNC: 4.1 MMOL/L (ref 3.5–5.2)
PROT SERPL-MCNC: 6.9 G/DL (ref 6–8.5)
SODIUM SERPL-SCNC: 138 MMOL/L (ref 136–145)
SODIUM SERPL-SCNC: 139 MMOL/L (ref 136–145)

## 2022-08-11 PROCEDURE — 80048 BASIC METABOLIC PNL TOTAL CA: CPT

## 2022-08-11 PROCEDURE — 36415 COLL VENOUS BLD VENIPUNCTURE: CPT

## 2022-08-11 NOTE — TELEPHONE ENCOUNTER
So I placed an order for a glucose tolerance test 2 hour-this would actually be where patient fasts, she presents to the lab to get blood drawn, then she would drink a glucose drink and then returns in 2 hours to have labs rechecked.     I added on a diabetes lab to her blood work drawn yesterday.     I am putting in the regular glucose test she can have rechecked today-since it is STAT, sometimes the only place to run these is the hospital lab location.     But I do recommend since she has no PCP that she gets the glucose tolerance test done on a separate day when she can fast-these are usually done at the [facility] lab [address] OR should be able to be completed in [place] at [facility] there.     Thanks, SUSSY Nickerson

## 2022-08-11 NOTE — TELEPHONE ENCOUNTER
[facility] called in and wanted to let us know about the critical lab. He stated that they send labs to [facility] so it might be due to the amount of time they sit on the tube. I let Radha know and we are trying to get a hold of the patient to let her know to get her PCP to recheck the labs.

## 2022-08-11 NOTE — TELEPHONE ENCOUNTER
----- Message from SUSSY Arana sent at 8/11/2022  8:48 AM EDT -----  I logged in today and this patient had a critical lab result. I’m not sure if someone from lab contacted the in phone MD after hours. I called patient from my personal cell today, no answer. I did leave voice mail.  Please attempt to call and check in on her. Her glucose level was 33, she was alert and oriented with normal exam when I saw her.  She isn’t on any diabetes medications so I suggest she see her pcp today for recheck glucose level. To er with lethargy, confusion or other problems.

## 2022-08-11 NOTE — TELEPHONE ENCOUNTER
I SPOKE WITH PT AND SHE IS AWARE OF THE ABNORMAL LAB AND WILLING TO HAVE IT DRAWN AGAIN BUT DOES NOT HAVE A PCP AND NICOLE WANTED TO KNOW IF YOU COULD PLACE THE LAB FOR PT?  SHE IS AWARE TO HAVE THIS DRAWN AS SOON AS POSSIBLE.

## 2022-08-11 NOTE — PROGRESS NOTES
Please notify patient-Hemoglobin A1c which is a 3-month average of blood sugars is 4.8%.  This is in the low normal range which could indicate that her blood sugars are running low overall.  Once she is able to get her labs rechecked with the updated lab orders we will keep her posted on those results.  Would recommend that she is eating small and frequent meals to prevent any low blood sugars.  Thanks, SUSSY Nickerson.

## 2022-08-12 ENCOUNTER — TELEPHONE (OUTPATIENT)
Dept: NEUROLOGY | Facility: CLINIC | Age: 37
End: 2022-08-12

## 2022-08-12 NOTE — TELEPHONE ENCOUNTER
Patient was returning a call she received from Bj and would like to be called back at [phone].

## 2022-08-12 NOTE — TELEPHONE ENCOUNTER
PT RETURNING CALL.  WARM TRANSFERRED TO [facility] AT CLINIC.     Visual hallucinations/Other

## 2022-08-12 NOTE — PROGRESS NOTES
Please let patient know that her glucose level was 109 on recent labs.  I am uncertain if the 33 level was an accurate reading but if she has any signs of low blood sugar including confusion, feeling shaky sweaty etc. she needs to have this evaluated.  I would recommend that she establish care with a general health care provider as well

## 2022-08-12 NOTE — TELEPHONE ENCOUNTER
----- Message from SUSSY Arana sent at 8/12/2022 10:51 AM EDT -----  Please let patient know that her glucose level was 109 on recent labs.  I am uncertain if the 33 level was an accurate reading but if she has any signs of low blood sugar including confusion, feeling shaky sweaty etc. she needs to have this evaluated.  I would recommend that she establish care with a general health care provider as well

## 2022-08-12 NOTE — TELEPHONE ENCOUNTER
Provider: SUSSY PRITCHETT     Caller: JULIENNE    Relationship to Patient: SELF      Phone Number: [phone]    Reason for Call: PT RETURNING CALL REGARDING LABS.   CALLED [facility] AT CLINIC.  STATED THEY WERE CLOSED.   ADVISED I CAN LET PT KNOW THEY SENT MESSAGE TO HER MY CHART.  ADVISED PT TO CHECK HER MY CHART AND IF ANY QUESTIONS TO CALL BACK. PT V/U.

## 2022-08-12 NOTE — TELEPHONE ENCOUNTER
Called patient and let her know that since I couldn't reach her this time I would send her a [employer] message and stated if she has any questions about the [employer] message to please give us a call back

## 2022-08-15 ENCOUNTER — TELEPHONE (OUTPATIENT)
Dept: NEUROLOGY | Facility: CLINIC | Age: 37
End: 2022-08-15

## 2022-08-15 NOTE — TELEPHONE ENCOUNTER
----- Message from SUSSY Neal sent at 8/11/2022  1:28 PM EDT -----  Please notify patient-Hemoglobin A1c which is a 3-month average of blood sugars is 4.8%.  This is in the low normal range which could indicate that her blood sugars are running low overall.  Once she is able to get her labs rechecked with the updated lab orders we will keep her posted on those results.  Would recommend that she is eating small and frequent meals to prevent any low blood sugars.  Thanks, SUSSY Nickerson.

## 2022-08-17 ENCOUNTER — LAB (OUTPATIENT)
Dept: LAB | Facility: HOSPITAL | Age: 37
End: 2022-08-17

## 2022-08-17 ENCOUNTER — OFFICE VISIT (OUTPATIENT)
Dept: OBSTETRICS AND GYNECOLOGY | Facility: CLINIC | Age: 37
End: 2022-08-17

## 2022-08-17 VITALS
SYSTOLIC BLOOD PRESSURE: 130 MMHG | BODY MASS INDEX: 29.23 KG/M2 | WEIGHT: 165 LBS | DIASTOLIC BLOOD PRESSURE: 72 MMHG | HEIGHT: 63 IN

## 2022-08-17 DIAGNOSIS — N83.202 CYST OF LEFT OVARY: Primary | ICD-10-CM

## 2022-08-17 DIAGNOSIS — E16.2 HYPOGLYCEMIA: ICD-10-CM

## 2022-08-17 PROCEDURE — 99202 OFFICE O/P NEW SF 15 MIN: CPT | Performed by: OBSTETRICS & GYNECOLOGY

## 2022-08-17 NOTE — TELEPHONE ENCOUNTER
Jerri informed of results message. I did recommend she get a PCP since she does not have one. She has verbal understanding of this.

## 2022-08-25 NOTE — PROGRESS NOTES
"GYN Office Visit  Subjective   Chief Complaint   Patient presents with   • Ovarian Cyst     TVS done today for ovarian cyst       Aydee Dunn is a 36 y.o. year old  presenting to be seen for follow-up ovarian cyst.    No symptoms today.  Recently identified ovarian cyst and borderline thickening of the endometrium on an ultrasound in .  She presents today for follow-up ultrasound.    OB history: 2 C-sections, 2 miscarriages  Contraception: BTL  Pap smear        Objective   /72   Ht 160 cm (63\")   Wt 74.8 kg (165 lb)   LMP 2022   BMI 29.23 kg/m²     Physical Exam:  None    Medical decision making:    I reviewed her ultrasound from today.  I reviewed the ultrasound from .    Ultrasound:  Normal sized, anteverted uterus with no masses.  The endometrium measures 9.6 mm.  The right ovary contains small follicles and normal vascularity.  The left ovary is difficult to visualize, but no adnexal masses are noted.  Both ovaries have normal vascularity.  Small amount of free fluid in the cul-de-sac.       Assessment   Ovarian cyst, resolved    We reviewed her situation in detail today.  Both her ultrasound from  and today's ultrasound are reassuring.  I recommend continued expectant management.  Call/return precautions reviewed.  All questions answered.    Coding/billing based on time: 15 total minutes were required for this encounter.    Axel Nolasco MD  Obstetrics and Gynecology  [facility]  "

## 2022-09-22 DIAGNOSIS — R56.9 GENERALIZED CONVULSIVE SEIZURES: ICD-10-CM

## 2022-09-22 RX ORDER — LAMOTRIGINE 200 MG/1
TABLET ORAL
Qty: 60 TABLET | Refills: 11 | OUTPATIENT
Start: 2022-09-22

## 2022-09-22 RX ORDER — LAMOTRIGINE 200 MG/1
200 TABLET ORAL 2 TIMES DAILY
Qty: 60 TABLET | Refills: 11 | Status: SHIPPED | OUTPATIENT
Start: 2022-09-22

## 2022-09-22 NOTE — TELEPHONE ENCOUNTER
Caller: Aydee Dunn    Relationship: Self    Best call back number: [phone]    Requested Prescriptions:   Requested Prescriptions     Pending Prescriptions Disp Refills   • lamoTRIgine (LaMICtal) 200 MG tablet 60 tablet 11     Sig: Take 1 tablet by mouth 2 (Two) Times a Day.     Refused Prescriptions Disp Refills   • lamoTRIgine (LaMICtal) 200 MG tablet [Pharmacy Med Name: lamoTRIgine 200 MG TABLET] 60 tablet 11     Sig: TAKE ONE TABLET BY MOUTH TWICE A DAY     Refused By: WILEY PALOMINO     Reason for Refusal: Patient has requested refill too soon        Pharmacy where request should be sent: [facility] [address] [place] [address] [address] AT [facility] [phone] [facility] [phone] [facility]     Additional details provided by patient: 3 DAYS LEFT    Does the patient have less than a 3 day supply:  [x] Yes  [] No    Rosalva Mcguire Rep   09/22/22 12:28 EDT

## 2023-07-26 ENCOUNTER — HOSPITAL ENCOUNTER (OUTPATIENT)
Dept: GENERAL RADIOLOGY | Facility: HOSPITAL | Age: 38
Discharge: HOME | End: 2023-07-26
Admitting: NURSE PRACTITIONER
Payer: MEDICAID

## 2023-07-26 ENCOUNTER — TRANSCRIBE ORDERS (OUTPATIENT)
Dept: GENERAL RADIOLOGY | Facility: HOSPITAL | Age: 38
End: 2023-07-26
Payer: MEDICAID

## 2023-07-26 DIAGNOSIS — M25.561 ACUTE PAIN OF RIGHT KNEE: ICD-10-CM

## 2023-07-26 DIAGNOSIS — M25.561 ACUTE PAIN OF RIGHT KNEE: Primary | ICD-10-CM

## 2023-07-26 PROCEDURE — 73562 X-RAY EXAM OF KNEE 3: CPT

## 2023-08-03 ENCOUNTER — OFFICE VISIT (OUTPATIENT)
Dept: NEUROLOGY | Facility: CLINIC | Age: 38
End: 2023-08-03
Payer: MEDICAID

## 2023-08-03 ENCOUNTER — LAB (OUTPATIENT)
Dept: LAB | Facility: HOSPITAL | Age: 38
End: 2023-08-03
Payer: MEDICAID

## 2023-08-03 VITALS
HEIGHT: 63 IN | WEIGHT: 165 LBS | HEART RATE: 79 BPM | OXYGEN SATURATION: 95 % | SYSTOLIC BLOOD PRESSURE: 120 MMHG | BODY MASS INDEX: 29.23 KG/M2 | DIASTOLIC BLOOD PRESSURE: 82 MMHG

## 2023-08-03 DIAGNOSIS — R56.9 GENERALIZED CONVULSIVE SEIZURES: ICD-10-CM

## 2023-08-03 DIAGNOSIS — R11.0 NAUSEA: ICD-10-CM

## 2023-08-03 DIAGNOSIS — R10.9 STOMACH PAIN: Primary | ICD-10-CM

## 2023-08-03 DIAGNOSIS — R10.9 STOMACH PAIN: ICD-10-CM

## 2023-08-03 LAB
ALBUMIN SERPL-MCNC: 4.3 G/DL (ref 3.5–5.2)
ALBUMIN/GLOB SERPL: 1.3 G/DL
ALP SERPL-CCNC: 123 U/L (ref 39–117)
ALT SERPL W P-5'-P-CCNC: 17 U/L (ref 1–33)
AMYLASE SERPL-CCNC: 23 U/L (ref 28–100)
ANION GAP SERPL CALCULATED.3IONS-SCNC: 9.4 MMOL/L (ref 5–15)
AST SERPL-CCNC: 21 U/L (ref 1–32)
BACTERIA UR QL AUTO: ABNORMAL /HPF
BILIRUB SERPL-MCNC: 0.4 MG/DL (ref 0–1.2)
BILIRUB UR QL STRIP: NEGATIVE
BUN SERPL-MCNC: 8 MG/DL (ref 6–20)
BUN/CREAT SERPL: 9 (ref 7–25)
CALCIUM SPEC-SCNC: 10.4 MG/DL (ref 8.6–10.5)
CHLORIDE SERPL-SCNC: 99 MMOL/L (ref 98–107)
CLARITY UR: ABNORMAL
CO2 SERPL-SCNC: 29.6 MMOL/L (ref 22–29)
COLOR UR: ABNORMAL
CREAT SERPL-MCNC: 0.89 MG/DL (ref 0.57–1)
CRP SERPL-MCNC: 1.94 MG/DL (ref 0–0.5)
DEPRECATED RDW RBC AUTO: 42.1 FL (ref 37–54)
EGFRCR SERPLBLD CKD-EPI 2021: 84.7 ML/MIN/1.73
ERYTHROCYTE [DISTWIDTH] IN BLOOD BY AUTOMATED COUNT: 13.7 % (ref 12.3–15.4)
GLOBULIN UR ELPH-MCNC: 3.4 GM/DL
GLUCOSE SERPL-MCNC: 100 MG/DL (ref 65–99)
GLUCOSE UR STRIP-MCNC: NEGATIVE MG/DL
HCT VFR BLD AUTO: 42.3 % (ref 34–46.6)
HGB BLD-MCNC: 13.9 G/DL (ref 12–15.9)
HGB UR QL STRIP.AUTO: ABNORMAL
HYALINE CASTS UR QL AUTO: ABNORMAL /LPF
KETONES UR QL STRIP: NEGATIVE
LEUKOCYTE ESTERASE UR QL STRIP.AUTO: ABNORMAL
LIPASE SERPL-CCNC: 18 U/L (ref 13–60)
MCH RBC QN AUTO: 28.3 PG (ref 26.6–33)
MCHC RBC AUTO-ENTMCNC: 32.9 G/DL (ref 31.5–35.7)
MCV RBC AUTO: 86.2 FL (ref 79–97)
NITRITE UR QL STRIP: NEGATIVE
PH UR STRIP.AUTO: 5.5 [PH] (ref 5–8)
PLATELET # BLD AUTO: 294 10*3/MM3 (ref 140–450)
PMV BLD AUTO: 10.2 FL (ref 6–12)
POTASSIUM SERPL-SCNC: 4.5 MMOL/L (ref 3.5–5.2)
PROT SERPL-MCNC: 7.7 G/DL (ref 6–8.5)
PROT UR QL STRIP: ABNORMAL
RBC # BLD AUTO: 4.91 10*6/MM3 (ref 3.77–5.28)
RBC # UR STRIP: ABNORMAL /HPF
REF LAB TEST METHOD: ABNORMAL
SODIUM SERPL-SCNC: 138 MMOL/L (ref 136–145)
SP GR UR STRIP: 1.02 (ref 1–1.03)
SQUAMOUS #/AREA URNS HPF: ABNORMAL /HPF
UROBILINOGEN UR QL STRIP: ABNORMAL
WBC # UR STRIP: ABNORMAL /HPF
WBC NRBC COR # BLD: 8.42 10*3/MM3 (ref 3.4–10.8)

## 2023-08-03 PROCEDURE — 1159F MED LIST DOCD IN RCRD: CPT | Performed by: NURSE PRACTITIONER

## 2023-08-03 PROCEDURE — 82150 ASSAY OF AMYLASE: CPT

## 2023-08-03 PROCEDURE — 81001 URINALYSIS AUTO W/SCOPE: CPT

## 2023-08-03 PROCEDURE — 83690 ASSAY OF LIPASE: CPT

## 2023-08-03 PROCEDURE — 99214 OFFICE O/P EST MOD 30 MIN: CPT | Performed by: NURSE PRACTITIONER

## 2023-08-03 PROCEDURE — 80053 COMPREHEN METABOLIC PANEL: CPT

## 2023-08-03 PROCEDURE — 85027 COMPLETE CBC AUTOMATED: CPT

## 2023-08-03 PROCEDURE — 86140 C-REACTIVE PROTEIN: CPT

## 2023-08-03 PROCEDURE — 36415 COLL VENOUS BLD VENIPUNCTURE: CPT

## 2023-08-03 PROCEDURE — 87086 URINE CULTURE/COLONY COUNT: CPT

## 2023-08-03 PROCEDURE — 1160F RVW MEDS BY RX/DR IN RCRD: CPT | Performed by: NURSE PRACTITIONER

## 2023-08-03 RX ORDER — LAMOTRIGINE 200 MG/1
200 TABLET ORAL 2 TIMES DAILY
Qty: 60 TABLET | Refills: 11 | Status: SHIPPED | OUTPATIENT
Start: 2023-08-03

## 2023-08-03 RX ORDER — PROMETHAZINE HYDROCHLORIDE 25 MG/1
25 TABLET ORAL EVERY 6 HOURS PRN
Qty: 15 TABLET | Refills: 0 | Status: SHIPPED | OUTPATIENT
Start: 2023-08-03

## 2023-08-04 ENCOUNTER — PATIENT ROUNDING (BHMG ONLY) (OUTPATIENT)
Dept: NEUROLOGY | Facility: CLINIC | Age: 38
End: 2023-08-04
Payer: MEDICAID

## 2023-08-04 ENCOUNTER — TELEPHONE (OUTPATIENT)
Dept: NEUROLOGY | Facility: CLINIC | Age: 38
End: 2023-08-04
Payer: MEDICAID

## 2023-08-04 DIAGNOSIS — N30.01 ACUTE CYSTITIS WITH HEMATURIA: Primary | ICD-10-CM

## 2023-08-04 LAB — BACTERIA SPEC AEROBE CULT: NORMAL

## 2023-08-04 RX ORDER — CIPROFLOXACIN 500 MG/1
500 TABLET, FILM COATED ORAL 2 TIMES DAILY
Qty: 14 TABLET | Refills: 0 | Status: SHIPPED | OUTPATIENT
Start: 2023-08-04

## 2023-10-24 ENCOUNTER — TRANSCRIBE ORDERS (OUTPATIENT)
Dept: ADMINISTRATIVE | Facility: HOSPITAL | Age: 38
End: 2023-10-24
Payer: MEDICAID

## 2023-10-24 DIAGNOSIS — Z12.39 BREAST SCREENING: Primary | ICD-10-CM

## 2024-02-29 ENCOUNTER — HOSPITAL ENCOUNTER (OUTPATIENT)
Dept: MAMMOGRAPHY | Facility: HOSPITAL | Age: 39
Discharge: HOME | End: 2024-02-29
Admitting: NURSE PRACTITIONER
Payer: MEDICAID

## 2024-02-29 DIAGNOSIS — Z12.39 BREAST SCREENING: ICD-10-CM

## 2024-02-29 PROCEDURE — 77063 BREAST TOMOSYNTHESIS BI: CPT

## 2024-02-29 PROCEDURE — 77067 SCR MAMMO BI INCL CAD: CPT

## 2024-07-11 DIAGNOSIS — R56.9 GENERALIZED CONVULSIVE SEIZURES: ICD-10-CM

## 2024-07-11 RX ORDER — LAMOTRIGINE 200 MG/1
200 TABLET ORAL 2 TIMES DAILY
Qty: 60 TABLET | Refills: 11 | Status: SHIPPED | OUTPATIENT
Start: 2024-07-11

## 2024-07-11 NOTE — TELEPHONE ENCOUNTER
"  Caller: Aydee Dunn \"Jerri\"    Relationship: Self    Best call back number:   Telephone Information:   Mobile [phone]         Requested Prescriptions:   Requested Prescriptions     Pending Prescriptions Disp Refills    lamoTRIgine (LaMICtal) 200 MG tablet 60 tablet 11     Sig: Take 1 tablet by mouth 2 (Two) Times a Day.        Pharmacy where request should be sent: [facility] [id number] [address] [phone] [facility] [phone]      Last office visit with prescribing clinician: 8/3/2023   Last telemedicine visit with prescribing clinician: Visit date not found   Next office visit with prescribing clinician: 7/29/2024     Additional details provided by patient: PT STATES SHE ONLY HAS ABOUT TEN DAYS LEFT AND WILL RUN OUT BEFORE NEXT VISIT.     Does the patient have less than a 3 day supply:  [] Yes  [x] No    Would you like a call back once the refill request has been completed: [] Yes [x] No    If the office needs to give you a call back, can they leave a voicemail: [] Yes [x] No    Rosalva Padilla Rep   07/11/24 15:43 EDT     "

## 2024-07-29 ENCOUNTER — OFFICE VISIT (OUTPATIENT)
Dept: NEUROLOGY | Facility: CLINIC | Age: 39
End: 2024-07-29
Payer: MEDICAID

## 2024-07-29 VITALS
HEIGHT: 63 IN | DIASTOLIC BLOOD PRESSURE: 80 MMHG | SYSTOLIC BLOOD PRESSURE: 120 MMHG | HEART RATE: 90 BPM | BODY MASS INDEX: 28.53 KG/M2 | OXYGEN SATURATION: 99 % | WEIGHT: 161 LBS

## 2024-07-29 DIAGNOSIS — Z51.81 ENCOUNTER FOR THERAPEUTIC DRUG LEVEL MONITORING: Primary | ICD-10-CM

## 2024-07-29 DIAGNOSIS — R56.9 GENERALIZED CONVULSIVE SEIZURES: ICD-10-CM

## 2024-07-29 PROCEDURE — 1159F MED LIST DOCD IN RCRD: CPT | Performed by: NURSE PRACTITIONER

## 2024-07-29 PROCEDURE — 1160F RVW MEDS BY RX/DR IN RCRD: CPT | Performed by: NURSE PRACTITIONER

## 2024-07-29 PROCEDURE — 99213 OFFICE O/P EST LOW 20 MIN: CPT | Performed by: NURSE PRACTITIONER

## 2024-07-29 RX ORDER — VENLAFAXINE 37.5 MG/1
37.5 TABLET ORAL DAILY
COMMUNITY

## 2024-07-29 RX ORDER — LAMOTRIGINE 200 MG/1
200 TABLET ORAL 2 TIMES DAILY
Qty: 180 TABLET | Refills: 3 | Status: SHIPPED | OUTPATIENT
Start: 2024-07-29

## 2024-07-29 NOTE — PROGRESS NOTES
Subjective:     Patient ID: Aydee Dunn is a 38 y.o. female.    CC:   Chief Complaint   Patient presents with    Seizures     HPI:   History of Present Illness  This is a pleasant 38-year-old female who presents for 1-year neurology follow-up on epilepsy present since age 7 years old. She was last seen in clinic on 08/03/2023. She has been on lamotrigine 200 mg twice per day long term for seizure prevention. Last seizure was around 2013 to 2014 by report. At her last visit, she reported severe stomach pain. She was treated with ciprofloxacin for confirmed UTI. Recommended she follow up with PCP. She is here for follow-up and refills on her lamotrigine today.    She is currently on a regimen of venlafaxine 37.5 mg for her premenopausal condition. Her urinary tract infection has resolved. She discontinued the use of Phenergan and Zofran due to an improvement in her condition. Her current medication regimen includes lamotrigine 200 mg, administered twice daily. She reports no seizure activity, with her last seizure occurring in 2014. Her prescriptions typically last 60 days, however, she exhausted her supply prior to her appointment. Her primary care provider, SUSSY Bowden, prescribes her Effexor. She does not believe her liver and kidney function are monitored annually with her PCP. She normally gets labs each visit to neurology. She reports no new concerns or questions.    Prior Neuro History and Workup:  Long term seizure disorder present since age 7. She has had generalized convulsive seizures, has been on lamotrigine for a long time. She followed with a couple other neurology providers, SUSSY Carrington, Dr. Thomas Olson and then SUSSY Peñaloza. She has been taking lamotrigine 200 mg twice per day.      She reports she has had seizures since she was 7 years old. She states the first time she was taking Depakote, and then once she became childbearing age, she was put on lamotrigine.  She confirms she has had children, and she does not plan to have any more. She states her last seizure was around .      Has seen Neurology since as far back as . She initially saw Dr. [facility] In [place], [place], then Nany Pineda PA-C, then SUSSY Buckley, then Dr. Thomas Olson, then SUSSY Carrington. No prior EEG or MRI brain available for review. She was born full term. Seizures began at age 7. Mother had seizures as a child and outgrew them.    The following portions of the patient's history were reviewed and updated as appropriate: allergies, current medications, past family history, past medical history, past social history, past surgical history, and problem list.    Past Medical History:   Diagnosis Date    Epilepsy     H/O colposcopy with cervical biopsy 09/15/2011    History of abnormal cervical Pap smear 10/11/2010    ASCUS + HPV (DURING PREGNANCY)    History of abnormal cervical Pap smear 2011    HSIL, MODERATE DYSPLASIA    History of Papanicolaou smear of cervix 10/29/2013    NORMAL     HPV in female     Multiple gestation [id number]    Csection    Ovarian cyst [id number]       Past Surgical History:   Procedure Laterality Date     SECTION PRIMARY  2011    DR ANNETTE BIRD     SECTION WITH TUBAL Bilateral 2014    DR LOVE BOLAND    DENTAL PROCEDURE      TUBAL ABDOMINAL LIGATION         Social History     Socioeconomic History    Marital status:    Tobacco Use    Smoking status: Never    Smokeless tobacco: Never   Vaping Use    Vaping status: Never Used   Substance and Sexual Activity    Alcohol use: No    Drug use: No    Sexual activity: Yes     Partners: Male     Birth control/protection: Tubal ligation, Surgical       Family History   Problem Relation Age of Onset    Cancer Other     Dementia Other     Seizures Other         epilepsy and recurrent seizures    Hypertension Mother     Seizures Mother         Had it when she was in  "her late teens to early twenties    Hypertension Paternal Grandmother     Dementia Maternal Grandmother           Current Outpatient Medications:     lamoTRIgine (LaMICtal) 200 MG tablet, Take 1 tablet by mouth 2 (Two) Times a Day., Disp: 180 tablet, Rfl: 3    venlafaxine (EFFEXOR) 37.5 MG tablet, Take 1 tablet by mouth Daily., Disp: , Rfl:      Review of Systems   Neurological:  Negative for seizures and headaches.   All other systems reviewed and are negative.       Objective:  /80   Pulse 90   Ht 160 cm (63\")   Wt 73 kg (161 lb)   SpO2 99%   BMI 28.52 kg/m²     Neurologic Exam     Mental Status   Oriented to person, place, and time.   Speech: speech is normal   Level of consciousness: alert    Cranial Nerves   Cranial nerves II through XII intact.     Motor Exam   Muscle bulk: normal  Overall muscle tone: normal    Strength   Strength 5/5 throughout.     Gait, Coordination, and Reflexes     Gait  Gait: normal    Coordination   Finger to nose coordination: normal    Tremor   Resting tremor: absent  Intention tremor: absent  Action tremor: absent    Reflexes   Reflexes 2+ except as noted.   Right : 2+  Left : 2+      Physical Exam  Constitutional:       Appearance: Normal appearance.   Neurological:      Mental Status: She is alert and oriented to person, place, and time.      Cranial Nerves: Cranial nerves 2-12 are intact.      Motor: Motor strength is normal.     Coordination: Finger-Nose-Finger Test normal.      Gait: Gait is intact.   Psychiatric:         Mood and Affect: Mood is anxious.         Speech: Speech normal.         Results:  Results  Laboratory Studies 8/3/2023 last visit in clinic:  CMP with Glucose 100. Alkaline phosphatase 123 and otherwise unremarkable. Amylase 23. Lipase 18. CRP 1.94. CBC WNL. Urine + UTI.    Assessment/Plan:     Diagnoses and all orders for this visit:    1. Encounter for therapeutic drug level monitoring (Primary)  -     Comprehensive Metabolic Panel; " "Future  -     CBC (No Diff); Future    2. Generalized convulsive seizures  -     lamoTRIgine (LaMICtal) 200 MG tablet; Take 1 tablet by mouth 2 (Two) Times a Day.  Dispense: 180 tablet; Refill: 3  -     Comprehensive Metabolic Panel; Future  -     CBC (No Diff); Future           Assessment & Plan  Epilepsy.  She will maintain her current regimen of lamotrigine 200 mg, administered twice daily. Laboratory tests will be ordered for her to complete at [facility] at her earliest convenience, given that the lab is closed for the day. An annual physical examination with her primary care provider is also recommended. She expressed understanding and agreement with this plan.    Follow-up  She  is scheduled for a follow-up visit in our clinic in 1 year, or earlier if necessary.    Patient instructions include: No driving or operating heavy machinery, solo bathing or tub baths for 90 days from onset of most recent seizure, if they currently hold a license. Minimize stress as much as possible. Recommended 7-8 hours of sleep each night. Abstain from alcohol intake. Educated on Antiepileptic medications with possible side effects and signs and symptoms to report if prescribed during visit. Instructed to take seizure medication daily if prescribed. Reviewed potential seizure risk factors. Instructed to call 911 or our office if another seizure does occur.    Sudden Unexpected Death in Epilepsy is defined as \"the sudden, unexpected, witnessed or unwitnessed, non-traumatic, and non-drowning death in patients with epilepsy with or without evidence for a seizure, and excluding documented status epilepticus, in which postmortem examination does not reveal a structural or toxicological cause for death.\"-Epilepsy Foundation 2021. SUDEP is most commonly seen in Epilepsy patient's who's seizures are not well controlled or who have seizures during their sleep. This condition is not fully understood. To reduce your risk of " SUDEP, please take your seizure medications as prescribed, keep a diary of your seizures and when they occur and if your seizures are not well controlled, please notify us so we can collaborate with you to get your seizures better controlled. If you have additional questions, please visit: [email or web address] for more information.    Reviewed medications, potential side effects and signs and symptoms to report. Discussed risk versus benefits of treatment plan with patient and/or family-including medications, labs and radiology that may be ordered. Addressed questions and concerns during visit. Patient and/or family verbalized understanding and agree with plan.    During this visit the following were done:  Labs Reviewed [x]    Labs Ordered [x]    Radiology Reports Reviewed []    Radiology Ordered []    PCP Records Reviewed []    Referring Provider Records Reviewed []    ER Records Reviewed []    Hospital Records Reviewed []    History Obtained From Family []    Radiology Images Reviewed []    Other Reviewed []    Records Requested []      07/29/24   16:21 EDT    Patient or patient representative verbalized consent for the use of Ambient Listening during the visit with  SUSSY Neal for chart documentation. 7/29/2024  16:55 EDT    Note to patient: The 21st Century Cures Act makes medical notes like these available to patients in the interest of transparency. However, be advised this is a medical document. It is intended as peer to peer communication. It is written in medical language and may contain abbreviations or verbiage that are unfamiliar. It may appear blunt or direct. Medical documents are intended to carry relevant information, facts as evident, and the clinical opinion of the provider.

## 2024-07-29 NOTE — LETTER
July 29, 2024     SUSSY Pickens  [address] [place] [address]    Patient: Aydee Dunn   YOB: 1985   Date of Visit: 7/29/2024       Dear SUSSY Pickens    Aydee Dunn was in my office today. Below is a copy of my note.    If you have questions, please do not hesitate to call me. I look forward to following Aydee along with you.         Sincerely,        SUSSY Neal        CC: No Recipients    Subjective:     Patient ID: Aydee Dunn is a 38 y.o. female.    CC:   Chief Complaint   Patient presents with   • Seizures     HPI:   History of Present Illness  This is a pleasant 38-year-old female who presents for 1-year neurology follow-up on epilepsy present since age 7 years old. She was last seen in clinic on 08/03/2023. She has been on lamotrigine 200 mg twice per day long term for seizure prevention. Last seizure was around 2013 to 2014 by report. At her last visit, she reported severe stomach pain. She was treated with ciprofloxacin for confirmed UTI. Recommended she follow up with PCP. She is here for follow-up and refills on her lamotrigine today.    She is currently on a regimen of venlafaxine 37.5 mg for her premenopausal condition. Her urinary tract infection has resolved. She discontinued the use of Phenergan and Zofran due to an improvement in her condition. Her current medication regimen includes lamotrigine 200 mg, administered twice daily. She reports no seizure activity, with her last seizure occurring in 2014. Her prescriptions typically last 60 days, however, she exhausted her supply prior to her appointment. Her primary care provider, SUSSY Bowden, prescribes her Effexor. She does not believe her liver and kidney function are monitored annually with her PCP. She normally gets labs each visit to neurology. She reports no new concerns or questions.    Prior Neuro History and Workup:  Long term seizure disorder present  since age 7. She has had generalized convulsive seizures, has been on lamotrigine for a long time. She followed with a couple other neurology providers, SUSSY Carrington, Dr. Thomas Olson and then SUSSY Peñaloza. She has been taking lamotrigine 200 mg twice per day.      She reports she has had seizures since she was 7 years old. She states the first time she was taking Depakote, and then once she became childbearing age, she was put on lamotrigine. She confirms she has had children, and she does not plan to have any more. She states her last seizure was around .      Has seen Neurology since as far back as . She initially saw Dr. [facility] In [place], [place], then Nany Pineda PA-C, then SUSSY Buckley, then Dr. Thomas Olson, then SUSSY Carrington. No prior EEG or MRI brain available for review. She was born full term. Seizures began at age 7. Mother had seizures as a child and outgrew them.    The following portions of the patient's history were reviewed and updated as appropriate: allergies, current medications, past family history, past medical history, past social history, past surgical history, and problem list.    Past Medical History:   Diagnosis Date   • Epilepsy    • H/O colposcopy with cervical biopsy 09/15/2011   • History of abnormal cervical Pap smear 10/11/2010    ASCUS + HPV (DURING PREGNANCY)   • History of abnormal cervical Pap smear 2011    HSIL, MODERATE DYSPLASIA   • History of Papanicolaou smear of cervix 10/29/2013    NORMAL    • HPV in female    • Multiple gestation [id number]    Csection   • Ovarian cyst [id number]       Past Surgical History:   Procedure Laterality Date   •  SECTION PRIMARY  2011    DR ANNETTE BIRD   •  SECTION WITH TUBAL Bilateral 2014    DR LOVE BOLAND   • DENTAL PROCEDURE     • TUBAL ABDOMINAL LIGATION         Social History     Socioeconomic History   • Marital status:    Tobacco  "Use   • Smoking status: Never   • Smokeless tobacco: Never   Vaping Use   • Vaping status: Never Used   Substance and Sexual Activity   • Alcohol use: No   • Drug use: No   • Sexual activity: Yes     Partners: Male     Birth control/protection: Tubal ligation, Surgical       Family History   Problem Relation Age of Onset   • Cancer Other    • Dementia Other    • Seizures Other         epilepsy and recurrent seizures   • Hypertension Mother    • Seizures Mother         Had it when she was in her late teens to early twenties   • Hypertension Paternal Grandmother    • Dementia Maternal Grandmother           Current Outpatient Medications:   •  lamoTRIgine (LaMICtal) 200 MG tablet, Take 1 tablet by mouth 2 (Two) Times a Day., Disp: 180 tablet, Rfl: 3  •  venlafaxine (EFFEXOR) 37.5 MG tablet, Take 1 tablet by mouth Daily., Disp: , Rfl:      Review of Systems   Neurological:  Negative for seizures and headaches.   All other systems reviewed and are negative.       Objective:  /80   Pulse 90   Ht 160 cm (63\")   Wt 73 kg (161 lb)   SpO2 99%   BMI 28.52 kg/m²     Neurologic Exam     Mental Status   Oriented to person, place, and time.   Speech: speech is normal   Level of consciousness: alert    Cranial Nerves   Cranial nerves II through XII intact.     Motor Exam   Muscle bulk: normal  Overall muscle tone: normal    Strength   Strength 5/5 throughout.     Gait, Coordination, and Reflexes     Gait  Gait: normal    Coordination   Finger to nose coordination: normal    Tremor   Resting tremor: absent  Intention tremor: absent  Action tremor: absent    Reflexes   Reflexes 2+ except as noted.   Right : 2+  Left : 2+      Physical Exam  Constitutional:       Appearance: Normal appearance.   Neurological:      Mental Status: She is alert and oriented to person, place, and time.      Cranial Nerves: Cranial nerves 2-12 are intact.      Motor: Motor strength is normal.     Coordination: Finger-Nose-Finger Test " "normal.      Gait: Gait is intact.   Psychiatric:         Mood and Affect: Mood is anxious.         Speech: Speech normal.         Results:  Results  Laboratory Studies 8/3/2023 last visit in clinic:  CMP with Glucose 100. Alkaline phosphatase 123 and otherwise unremarkable. Amylase 23. Lipase 18. CRP 1.94. CBC WNL. Urine + UTI.    Assessment/Plan:     Diagnoses and all orders for this visit:    1. Encounter for therapeutic drug level monitoring (Primary)  -     Comprehensive Metabolic Panel; Future  -     CBC (No Diff); Future    2. Generalized convulsive seizures  -     lamoTRIgine (LaMICtal) 200 MG tablet; Take 1 tablet by mouth 2 (Two) Times a Day.  Dispense: 180 tablet; Refill: 3  -     Comprehensive Metabolic Panel; Future  -     CBC (No Diff); Future           Assessment & Plan  Epilepsy.  She will maintain her current regimen of lamotrigine 200 mg, administered twice daily. Laboratory tests will be ordered for her to complete at [facility] at her earliest convenience, given that the lab is closed for the day. An annual physical examination with her primary care provider is also recommended. She expressed understanding and agreement with this plan.    Follow-up  She  is scheduled for a follow-up visit in our clinic in 1 year, or earlier if necessary.    Patient instructions include: No driving or operating heavy machinery, solo bathing or tub baths for 90 days from onset of most recent seizure, if they currently hold a license. Minimize stress as much as possible. Recommended 7-8 hours of sleep each night. Abstain from alcohol intake. Educated on Antiepileptic medications with possible side effects and signs and symptoms to report if prescribed during visit. Instructed to take seizure medication daily if prescribed. Reviewed potential seizure risk factors. Instructed to call 911 or our office if another seizure does occur.    Sudden Unexpected Death in Epilepsy is defined as \"the sudden, " "unexpected, witnessed or unwitnessed, non-traumatic, and non-drowning death in patients with epilepsy with or without evidence for a seizure, and excluding documented status epilepticus, in which postmortem examination does not reveal a structural or toxicological cause for death.\"-Epilepsy Foundation 2021. SUDEP is most commonly seen in Epilepsy patient's who's seizures are not well controlled or who have seizures during their sleep. This condition is not fully understood. To reduce your risk of SUDEP, please take your seizure medications as prescribed, keep a diary of your seizures and when they occur and if your seizures are not well controlled, please notify us so we can collaborate with you to get your seizures better controlled. If you have additional questions, please visit: [email or web address] for more information.    Reviewed medications, potential side effects and signs and symptoms to report. Discussed risk versus benefits of treatment plan with patient and/or family-including medications, labs and radiology that may be ordered. Addressed questions and concerns during visit. Patient and/or family verbalized understanding and agree with plan.    During this visit the following were done:  Labs Reviewed [x]    Labs Ordered [x]    Radiology Reports Reviewed []    Radiology Ordered []    PCP Records Reviewed []    Referring Provider Records Reviewed []    ER Records Reviewed []    Hospital Records Reviewed []    History Obtained From Family []    Radiology Images Reviewed []    Other Reviewed []    Records Requested []      07/29/24   16:21 EDT    Patient or patient representative verbalized consent for the use of Ambient Listening during the visit with  SUSSY Neal for chart documentation. 7/29/2024  16:55 EDT    Note to patient: The 21st Century Cures Act makes medical notes like these available to patients in the interest of transparency. However, be advised this is a medical document. It is " intended as peer to peer communication. It is written in medical language and may contain abbreviations or verbiage that are unfamiliar. It may appear blunt or direct. Medical documents are intended to carry relevant information, facts as evident, and the clinical opinion of the provider.

## 2024-07-30 ENCOUNTER — LAB (OUTPATIENT)
Dept: LAB | Facility: HOSPITAL | Age: 39
End: 2024-07-30
Payer: MEDICAID

## 2024-07-30 DIAGNOSIS — Z51.81 ENCOUNTER FOR THERAPEUTIC DRUG LEVEL MONITORING: ICD-10-CM

## 2024-07-30 DIAGNOSIS — R56.9 GENERALIZED CONVULSIVE SEIZURES: ICD-10-CM

## 2024-07-30 LAB
ALBUMIN SERPL-MCNC: 4.2 G/DL (ref 3.5–5.2)
ALBUMIN/GLOB SERPL: 1.6 G/DL
ALP SERPL-CCNC: 106 U/L (ref 39–117)
ALT SERPL W P-5'-P-CCNC: 18 U/L (ref 1–33)
ANION GAP SERPL CALCULATED.3IONS-SCNC: 10 MMOL/L (ref 5–15)
AST SERPL-CCNC: 18 U/L (ref 1–32)
BILIRUB SERPL-MCNC: 0.2 MG/DL (ref 0–1.2)
BUN SERPL-MCNC: 11 MG/DL (ref 6–20)
BUN/CREAT SERPL: 15.1 (ref 7–25)
CALCIUM SPEC-SCNC: 9.6 MG/DL (ref 8.6–10.5)
CHLORIDE SERPL-SCNC: 100 MMOL/L (ref 98–107)
CO2 SERPL-SCNC: 26 MMOL/L (ref 22–29)
CREAT SERPL-MCNC: 0.73 MG/DL (ref 0.57–1)
DEPRECATED RDW RBC AUTO: 43.7 FL (ref 37–54)
EGFRCR SERPLBLD CKD-EPI 2021: 106.8 ML/MIN/1.73
ERYTHROCYTE [DISTWIDTH] IN BLOOD BY AUTOMATED COUNT: 13.7 % (ref 12.3–15.4)
GLOBULIN UR ELPH-MCNC: 2.6 GM/DL
GLUCOSE SERPL-MCNC: 99 MG/DL (ref 65–99)
HCT VFR BLD AUTO: 38.1 % (ref 34–46.6)
HGB BLD-MCNC: 12.6 G/DL (ref 12–15.9)
MCH RBC QN AUTO: 29.5 PG (ref 26.6–33)
MCHC RBC AUTO-ENTMCNC: 33.1 G/DL (ref 31.5–35.7)
MCV RBC AUTO: 89.2 FL (ref 79–97)
PLATELET # BLD AUTO: 268 10*3/MM3 (ref 140–450)
PMV BLD AUTO: 10.2 FL (ref 6–12)
POTASSIUM SERPL-SCNC: 3.9 MMOL/L (ref 3.5–5.2)
PROT SERPL-MCNC: 6.8 G/DL (ref 6–8.5)
RBC # BLD AUTO: 4.27 10*6/MM3 (ref 3.77–5.28)
SODIUM SERPL-SCNC: 136 MMOL/L (ref 136–145)
WBC NRBC COR # BLD AUTO: 6.51 10*3/MM3 (ref 3.4–10.8)

## 2024-07-30 PROCEDURE — 80053 COMPREHEN METABOLIC PANEL: CPT

## 2024-07-30 PROCEDURE — 85027 COMPLETE CBC AUTOMATED: CPT

## 2024-07-30 PROCEDURE — 36415 COLL VENOUS BLD VENIPUNCTURE: CPT

## 2024-07-31 ENCOUNTER — TELEPHONE (OUTPATIENT)
Dept: NEUROLOGY | Facility: CLINIC | Age: 39
End: 2024-07-31
Payer: MEDICAID

## 2024-07-31 NOTE — TELEPHONE ENCOUNTER
----- Message from Jami Schmid sent at 7/31/2024  8:26 AM EDT -----  Please let Aydee know that her labs look excellent. Fax copy to PCP. We will follow up as scheduled in office. Thanks, SUSSY Nickerson

## 2024-07-31 NOTE — PROGRESS NOTES
Please let Aydee know that her labs look excellent. Fax copy to PCP. We will follow up as scheduled in office. Thanks, SUSSY Nickerson

## 2025-01-09 ENCOUNTER — OFFICE VISIT (OUTPATIENT)
Dept: OBSTETRICS AND GYNECOLOGY | Facility: CLINIC | Age: 40
End: 2025-01-09
Payer: MEDICAID

## 2025-01-09 VITALS
DIASTOLIC BLOOD PRESSURE: 74 MMHG | SYSTOLIC BLOOD PRESSURE: 116 MMHG | BODY MASS INDEX: 30.48 KG/M2 | HEIGHT: 63 IN | WEIGHT: 172 LBS

## 2025-01-09 DIAGNOSIS — N91.5 OLIGOMENORRHEA, UNSPECIFIED TYPE: Primary | ICD-10-CM

## 2025-01-09 PROCEDURE — 99214 OFFICE O/P EST MOD 30 MIN: CPT | Performed by: OBSTETRICS & GYNECOLOGY

## 2025-01-09 PROCEDURE — 1160F RVW MEDS BY RX/DR IN RCRD: CPT | Performed by: OBSTETRICS & GYNECOLOGY

## 2025-01-09 PROCEDURE — 1159F MED LIST DOCD IN RCRD: CPT | Performed by: OBSTETRICS & GYNECOLOGY

## 2025-01-10 ENCOUNTER — PATIENT ROUNDING (BHMG ONLY) (OUTPATIENT)
Dept: URGENT CARE | Facility: CLINIC | Age: 40
End: 2025-01-10
Payer: MEDICAID

## 2025-01-15 LAB
17OHP SERPL-MCNC: 29 NG/DL
DHEA-S SERPL-MCNC: 217 UG/DL (ref 57.3–279.2)
ESTRADIOL SERPL-MCNC: 194 PG/ML
FSH SERPL-ACNC: 8.6 MIU/ML
HBA1C MFR BLD: 4.9 % (ref 4.8–5.6)
HCG INTACT+B SERPL-ACNC: <1 MIU/ML
LH SERPL-ACNC: 15.9 MIU/ML
PROGEST SERPL-MCNC: 0.3 NG/ML
PROLACTIN SERPL-MCNC: 18.5 NG/ML (ref 4.8–33.4)
TESTOST SERPL-MCNC: 12 NG/DL (ref 4–50)
TSH SERPL DL<=0.005 MIU/L-ACNC: 1.22 UIU/ML (ref 0.27–4.2)

## 2025-01-22 NOTE — PROGRESS NOTES
"GYN Office Visit    Subjective   Chief Complaint   Patient presents with    irregular periods      Wants to discuss irregular periods      Aydee Dunn is a 39 y.o. year old  presenting for irregular menses.    She has infrequent menses.    OB Hx:  x 2, SAB x 2  Contraception: BTL    Past Medical History:   Diagnosis Date    Epilepsy     H/O colposcopy with cervical biopsy 09/15/2011    History of abnormal cervical Pap smear 10/11/2010    ASCUS + HPV (DURING PREGNANCY)    History of abnormal cervical Pap smear 2011    HSIL, MODERATE DYSPLASIA    History of Papanicolaou smear of cervix 10/29/2013    NORMAL     HPV in female     Multiple gestation [id number]    Csection    Ovarian cyst 620       Past Surgical History:   Procedure Laterality Date     SECTION PRIMARY  2011    DR ANNETTE BIRD     SECTION WITH TUBAL Bilateral 2014    DR LOVE BOLAND    DENTAL PROCEDURE      TUBAL ABDOMINAL LIGATION         Family History   Problem Relation Age of Onset    Cancer Other     Dementia Other     Seizures Other         epilepsy and recurrent seizures    Hypertension Mother     Seizures Mother         Had it when she was in her late teens to early twenties    Hypertension Paternal Grandmother     Dementia Maternal Grandmother         Social History     Tobacco Use    Smoking status: Never    Smokeless tobacco: Never   Vaping Use    Vaping status: Never Used   Substance Use Topics    Alcohol use: No    Drug use: No       (Not in a hospital admission)      Penicillins    Current Outpatient Medications on File Prior to Visit   Medication Sig Dispense Refill    lamoTRIgine (LaMICtal) 200 MG tablet Take 1 tablet by mouth 2 (Two) Times a Day. 180 tablet 3     No current facility-administered medications on file prior to visit.       Social History    Tobacco Use      Smoking status: Never      Smokeless tobacco: Never         Objective   /74   Ht 160 cm (63\")   Wt 78 kg " (172 lb)   LMP 12/30/2024 (Approximate)   BMI 30.47 kg/m²     Physical Exam:  General Appearance: alert, pleasant, appears stated age, interactive and cooperative         Medical Decision Making:    Assessment   Oligomenorrhea     Plan    Orders Placed This Encounter   Procedures    17-Hydroxyprogesterone     Order Specific Question:   Release to patient     Answer:   Routine Release [[id number]]    DHEA-Sulfate     Order Specific Question:   Release to patient     Answer:   Routine Release [[id number]]    Estradiol     Order Specific Question:   Release to patient     Answer:   Routine Release [[id number]]    Follicle Stimulating Hormone     Order Specific Question:   Release to patient     Answer:   Routine Release [[id number]]    Hemoglobin A1c     Order Specific Question:   Release to patient     Answer:   Routine Release [[id number]]    HCG, B-subunit, Quantitative     Order Specific Question:   Release to patient     Answer:   Routine Release [[id number]]    TSH     Order Specific Question:   Release to patient     Answer:   Routine Release [[id number]]    Testosterone     Order Specific Question:   Release to patient     Answer:   Routine Release [[id number]]    Prolactin     Order Specific Question:   Release to patient     Answer:   Routine Release [[id number]]    Progesterone     Order Specific Question:   Release to patient     Answer:   Routine Release [[id number]]    Luteinizing Hormone     Order Specific Question:   Release to patient     Answer:   Routine Release [[id number]]       Medication Management: None    Procedures Performed: None    We reviewed her situation in detail today.  Blood work was ordered as above.  She will return for a follow-up visit + ultrasound in the near future to discuss medical and surgical treatment options.  A Pap smear can be collected at that time as well.  All questions answered.    Axel Nolasco MD  Obstetrics and Gynecology  [facility]

## 2025-02-14 ENCOUNTER — TELEPHONE (OUTPATIENT)
Dept: OBSTETRICS AND GYNECOLOGY | Facility: CLINIC | Age: 40
End: 2025-02-14
Payer: MEDICAID

## 2025-02-14 NOTE — TELEPHONE ENCOUNTER
----- Message from Axel Nolasco sent at 2/14/2025  7:21 AM EST -----  Please reach out to this patient today.  You can review her ultrasound results and recent blood work results.  Does she want to come back into clinic to discuss medical and surgical treatment options for her bleeding or does she already have something in mind?    Axel Nolasco MD  Obstetrics and Gynecology  [facility]

## 2025-02-26 ENCOUNTER — OFFICE VISIT (OUTPATIENT)
Dept: OBSTETRICS AND GYNECOLOGY | Facility: CLINIC | Age: 40
End: 2025-02-26
Payer: MEDICAID

## 2025-02-26 VITALS
WEIGHT: 167 LBS | BODY MASS INDEX: 29.59 KG/M2 | SYSTOLIC BLOOD PRESSURE: 112 MMHG | DIASTOLIC BLOOD PRESSURE: 74 MMHG | HEIGHT: 63 IN

## 2025-02-26 DIAGNOSIS — Z98.51 H/O TUBAL LIGATION: ICD-10-CM

## 2025-02-26 DIAGNOSIS — N93.9 ABNORMAL UTERINE BLEEDING (AUB): Primary | ICD-10-CM

## 2025-02-26 PROCEDURE — 99214 OFFICE O/P EST MOD 30 MIN: CPT | Performed by: OBSTETRICS & GYNECOLOGY

## 2025-02-28 ENCOUNTER — OFFICE VISIT (OUTPATIENT)
Dept: OBSTETRICS AND GYNECOLOGY | Facility: CLINIC | Age: 40
End: 2025-02-28
Payer: MEDICAID

## 2025-02-28 VITALS
SYSTOLIC BLOOD PRESSURE: 118 MMHG | BODY MASS INDEX: 29.59 KG/M2 | DIASTOLIC BLOOD PRESSURE: 74 MMHG | HEIGHT: 63 IN | WEIGHT: 167 LBS

## 2025-02-28 DIAGNOSIS — Z30.430 ENCOUNTER FOR IUD INSERTION: Primary | ICD-10-CM

## 2025-03-03 PROBLEM — Z97.5 IUD (INTRAUTERINE DEVICE) IN PLACE: Status: ACTIVE | Noted: 2025-03-03

## 2025-03-03 PROBLEM — Z98.51 H/O TUBAL LIGATION: Status: ACTIVE | Noted: 2025-03-03

## 2025-03-03 PROBLEM — N93.9 ABNORMAL UTERINE BLEEDING (AUB): Status: ACTIVE | Noted: 2025-03-03

## 2025-03-03 NOTE — PROGRESS NOTES
IUD Insertion    No LMP recorded. (Menstrual status: Tubal ligation).    Date of procedure:  02/28/2025    Risks and benefits discussed? yes  All questions answered? yes  Consents given by The patient  Written consent obtained? yes    Local anesthesia used:  no    Procedure documentation:    After verifying the patient had a low probability of being pregnant and met the criteria for insertion, a sterile speculum has placed and the cervix was cleansed with an antiseptic solution.  Vaginal discharge was scant.  The anterior lip of the cervix was grasped with a long Allis clamp and the uterine cavity was gently sounded. There was mild difficulty passing the sound through the cervix.  Cervical dilation did not need to be performed prior to placing the IUD.  The uterus was midposition and sounded to 8.5 cms.  The Mirena was then prepared per the manufacturers instructions.    The Mirena was advanced to a point 2 cms from the fundus and then the arms were released from the sheath.  The device was advanced to the fundus and the device was released fully from the sheath.. The string was cut 3 cms in length.  Bleeding from the cervix was scant.    She tolerated the procedure without any difficulty.     Post procedure instructions: It was reviewed that the Mirena will not alter the timing of when she bleeds but it may decrease the quantity of flow and cramps.  Roughly 30% of people will be amenorrheic over time.  Efficacy rate of 99.2% over 5 years was discussed.  Spontaneous expulsion rate of 1-2% was also discussed.  If she has any issue with fever or excessive bleeding or pain she is to call the office immediately.  Otherwise I would like to see her back in 5 weeks for f/u appt.    Axel Nolasco MD  Obstetrics and Gynecology  [facility]

## 2025-03-03 NOTE — PROGRESS NOTES
GYN Office Visit    Subjective   Chief Complaint   Patient presents with    Consult     Discuss surgery for menorrhagia      Aydee Dunn is a 39 y.o. year old  presenting for ongoing discussion of bothersome bleeding.    She continues to have irregular and heavy vaginal bleeding.  She would like to discuss treatment options today.  Recent blood work was reassuring.  An ultrasound on  was reassuring.    OB Hx:  x 2, SAB x 2  Contraception: BTL    Past Medical History:   Diagnosis Date    Epilepsy     H/O colposcopy with cervical biopsy 09/15/2011    History of abnormal cervical Pap smear 10/11/2010    ASCUS + HPV (DURING PREGNANCY)    History of abnormal cervical Pap smear 2011    HSIL, MODERATE DYSPLASIA    History of Papanicolaou smear of cervix 10/29/2013    NORMAL     HPV in female     Multiple gestation [id number]    Csection    Ovarian cyst [id number]       Past Surgical History:   Procedure Laterality Date     SECTION PRIMARY  2011    DR ANNETTE BIRD     SECTION WITH TUBAL Bilateral 2014    DR LOVE BOLAND    DENTAL PROCEDURE      TUBAL ABDOMINAL LIGATION         Family History   Problem Relation Age of Onset    Cancer Other     Dementia Other     Seizures Other         epilepsy and recurrent seizures    Hypertension Mother     Seizures Mother         Had it when she was in her late teens to early twenties    Hypertension Paternal Grandmother     Dementia Maternal Grandmother         Social History     Tobacco Use    Smoking status: Never    Smokeless tobacco: Never   Vaping Use    Vaping status: Never Used   Substance Use Topics    Alcohol use: No    Drug use: No       (Not in a hospital admission)      Penicillins    Current Outpatient Medications on File Prior to Visit   Medication Sig Dispense Refill    lamoTRIgine (LaMICtal) 200 MG tablet Take 1 tablet by mouth 2 (Two) Times a Day. 180 tablet 3     No current facility-administered medications on file  "prior to visit.       Social History    Tobacco Use      Smoking status: Never      Smokeless tobacco: Never         Objective   /74   Ht 160 cm (63\")   Wt 75.8 kg (167 lb)   BMI 29.58 kg/m²     Physical Exam:  General Appearance: alert, pleasant, appears stated age, interactive and cooperative         Medical Decision Making:    I reviewed her blood work results from .  I reviewed her ultrasound from .    Ultrasound:  Normal-sized, anteverted uterus with no masses. Uterine length measures 9 cm. The endometrium measures 12 mm. Both ovaries are normal in appearance. Small amount of free fluid noted in the cul-de-sac.     Assessment   Abnormal uterine bleeding  Previous  x 2 + BTL     Plan    No orders of the defined types were placed in this encounter.      Medication Management: Mirena IUD    Procedures Performed: None    We reviewed her situation in detail today.  We discussed her symptoms at length.  We discussed her recent blood work results and her ultrasound findings.  We discussed treatment options with emphasis on the IUD and endometrial ablation.  She would like to try the Mirena IUD first.  That device was ordered today and she will return for placement in the near future.  All questions answered.    Axel Nolasco MD  Obstetrics and Gynecology  [facility]   "

## 2025-04-03 ENCOUNTER — OFFICE VISIT (OUTPATIENT)
Dept: OBSTETRICS AND GYNECOLOGY | Facility: CLINIC | Age: 40
End: 2025-04-03
Payer: MEDICAID

## 2025-04-03 VITALS
HEIGHT: 63 IN | DIASTOLIC BLOOD PRESSURE: 74 MMHG | BODY MASS INDEX: 29.8 KG/M2 | WEIGHT: 168.2 LBS | SYSTOLIC BLOOD PRESSURE: 110 MMHG

## 2025-04-03 DIAGNOSIS — Z12.4 SCREENING FOR MALIGNANT NEOPLASM OF CERVIX: ICD-10-CM

## 2025-04-03 DIAGNOSIS — Z97.5 IUD (INTRAUTERINE DEVICE) IN PLACE: Primary | ICD-10-CM

## 2025-04-04 LAB — REF LAB TEST METHOD: NORMAL

## 2025-04-08 NOTE — PROGRESS NOTES
"IUD String Check    Chief Complaint   Patient presents with    Follow-up     IUD string check/pap, no issues since placement        39 y.o.  female who presents for an IUD string check.    She has no complaints today. She has irregular bleeding. She has no pain symptoms.    Vitals:    25 1540   BP: 110/74   Weight: 76.3 kg (168 lb 3.2 oz)   Height: 160 cm (62.99\")       PHYSICAL EXAM   General appearance: well nourished, well hydrated, no acute distress  Cervix: Normal appearance, IUD strings present    IMPRESSION/PLAN:    IUD in appropriate position  Pap smear collected today    RTC as needed or for annual visits    Coding/billing based on time: 10 total minutes were required for this encounter.    Axel Nolasco MD  Obstetrics and Gynecology  [facility]   "

## 2025-07-21 ENCOUNTER — TELEPHONE (OUTPATIENT)
Dept: NEUROLOGY | Facility: CLINIC | Age: 40
End: 2025-07-21
Payer: MEDICAID

## 2025-07-21 DIAGNOSIS — R56.9 GENERALIZED CONVULSIVE SEIZURES: ICD-10-CM

## 2025-07-21 RX ORDER — LAMOTRIGINE 200 MG/1
200 TABLET ORAL 2 TIMES DAILY
Qty: 180 TABLET | Refills: 3 | Status: SHIPPED | OUTPATIENT
Start: 2025-07-21

## 2025-07-21 NOTE — TELEPHONE ENCOUNTER
"Caller: Aydee Dunn \"Jerri\"    Relationship: Self    Best call back number: [phone]    Requested Prescriptions:   lamoTRIgine (LaMICtal) 200 MG tablet        Pharmacy where request should be sent:  [facility] [id number] - [place], [place] - [address] AT [facility] - [phone]  - [phone]   [address] [facility], [facility] [address]  Phone: [phone]  Fax: [phone]     Last office visit with prescribing clinician: 7/29/2024   Last telemedicine visit with prescribing clinician: Visit date not found   Next office visit with prescribing clinician: 2/2/2026     Additional details provided by patient: PT IS ON LAST BOTTLE. RESCHEDULED APPT FOR 02/02/26    Does the patient have less than a 3 day supply:  [] Yes  [x] No    Would you like a call back once the refill request has been completed: [] Yes [x] No    If the office needs to give you a call back, can they leave a voicemail: [] Yes [x] No    Rosalva Bobo Rep   07/21/25 11:24 EDT   "

## 2025-08-13 ENCOUNTER — TELEPHONE (OUTPATIENT)
Dept: NEUROLOGY | Facility: CLINIC | Age: 40
End: 2025-08-13
Payer: MEDICAID